# Patient Record
Sex: MALE | Race: WHITE | NOT HISPANIC OR LATINO | Employment: OTHER | URBAN - NONMETROPOLITAN AREA
[De-identification: names, ages, dates, MRNs, and addresses within clinical notes are randomized per-mention and may not be internally consistent; named-entity substitution may affect disease eponyms.]

---

## 2023-11-10 ENCOUNTER — OFFICE VISIT (OUTPATIENT)
Dept: PRIMARY CARE | Facility: CLINIC | Age: 73
End: 2023-11-10
Payer: MEDICARE

## 2023-11-10 VITALS
DIASTOLIC BLOOD PRESSURE: 80 MMHG | HEIGHT: 67 IN | SYSTOLIC BLOOD PRESSURE: 150 MMHG | WEIGHT: 254 LBS | BODY MASS INDEX: 39.87 KG/M2 | HEART RATE: 83 BPM

## 2023-11-10 DIAGNOSIS — Z79.4 TYPE 2 DIABETES MELLITUS WITH HYPERGLYCEMIA, WITH LONG-TERM CURRENT USE OF INSULIN (MULTI): ICD-10-CM

## 2023-11-10 DIAGNOSIS — R35.1 BENIGN PROSTATIC HYPERPLASIA WITH NOCTURIA: ICD-10-CM

## 2023-11-10 DIAGNOSIS — N40.1 BENIGN PROSTATIC HYPERPLASIA WITH NOCTURIA: ICD-10-CM

## 2023-11-10 DIAGNOSIS — E11.65 TYPE 2 DIABETES MELLITUS WITH HYPERGLYCEMIA, WITH LONG-TERM CURRENT USE OF INSULIN (MULTI): ICD-10-CM

## 2023-11-10 DIAGNOSIS — I10 PRIMARY HYPERTENSION: Primary | ICD-10-CM

## 2023-11-10 PROCEDURE — 1160F RVW MEDS BY RX/DR IN RCRD: CPT | Performed by: FAMILY MEDICINE

## 2023-11-10 PROCEDURE — 3079F DIAST BP 80-89 MM HG: CPT | Performed by: FAMILY MEDICINE

## 2023-11-10 PROCEDURE — 1159F MED LIST DOCD IN RCRD: CPT | Performed by: FAMILY MEDICINE

## 2023-11-10 PROCEDURE — 99204 OFFICE O/P NEW MOD 45 MIN: CPT | Performed by: FAMILY MEDICINE

## 2023-11-10 PROCEDURE — 3077F SYST BP >= 140 MM HG: CPT | Performed by: FAMILY MEDICINE

## 2023-11-10 RX ORDER — METFORMIN HYDROCHLORIDE 500 MG/1
1000 TABLET, EXTENDED RELEASE ORAL
COMMUNITY
Start: 2023-08-23 | End: 2023-12-27 | Stop reason: SDUPTHER

## 2023-11-10 RX ORDER — LISINOPRIL AND HYDROCHLOROTHIAZIDE 12.5; 2 MG/1; MG/1
1 TABLET ORAL 2 TIMES DAILY
COMMUNITY
Start: 2023-08-18 | End: 2023-12-27 | Stop reason: SDUPTHER

## 2023-11-10 RX ORDER — GLIPIZIDE 10 MG/1
10 TABLET, FILM COATED, EXTENDED RELEASE ORAL DAILY
COMMUNITY
Start: 2023-08-18 | End: 2023-12-27 | Stop reason: SDUPTHER

## 2023-11-10 RX ORDER — INSULIN GLARGINE 300 U/ML
80 INJECTION, SOLUTION SUBCUTANEOUS EVERY MORNING
COMMUNITY
Start: 2023-10-25 | End: 2023-12-27 | Stop reason: SDUPTHER

## 2023-11-10 RX ORDER — TIRZEPATIDE 7.5 MG/.5ML
7.5 INJECTION, SOLUTION SUBCUTANEOUS
COMMUNITY
Start: 2023-10-23 | End: 2024-01-19 | Stop reason: WASHOUT

## 2023-11-10 RX ORDER — FLASH GLUCOSE SENSOR
KIT MISCELLANEOUS AS NEEDED
COMMUNITY
Start: 2023-09-01

## 2023-11-10 RX ORDER — AMLODIPINE BESYLATE 5 MG/1
5 TABLET ORAL DAILY
Qty: 30 TABLET | Refills: 1 | Status: SHIPPED | OUTPATIENT
Start: 2023-11-10 | End: 2023-12-27 | Stop reason: SINTOL

## 2023-11-10 RX ORDER — LOVASTATIN 20 MG/1
20 TABLET ORAL NIGHTLY
COMMUNITY
Start: 2023-09-22 | End: 2023-12-27 | Stop reason: SDUPTHER

## 2023-11-10 RX ORDER — TAMSULOSIN HYDROCHLORIDE 0.4 MG/1
0.4 CAPSULE ORAL DAILY
COMMUNITY
Start: 2023-09-22 | End: 2023-12-27 | Stop reason: SDUPTHER

## 2023-11-10 ASSESSMENT — PATIENT HEALTH QUESTIONNAIRE - PHQ9
SUM OF ALL RESPONSES TO PHQ9 QUESTIONS 1 AND 2: 0
2. FEELING DOWN, DEPRESSED OR HOPELESS: NOT AT ALL
1. LITTLE INTEREST OR PLEASURE IN DOING THINGS: NOT AT ALL

## 2023-11-10 ASSESSMENT — ENCOUNTER SYMPTOMS
PALPITATIONS: 0
NUMBNESS: 1
COUGH: 0
SHORTNESS OF BREATH: 0

## 2023-11-10 NOTE — PROGRESS NOTES
"Subjective   Patient ID: Yg Vergara is a 73 y.o. male who presents for pt here to est as  new patient .    HPI   Refuses P20 multiple times     DM II June 2022 A1c = 9.1     States labs done 2  months ago states was 7      Was on ozempic and change munjaro now      Latha dupont  diabetic pharmacist  consultant started mounjaro 7.5 mg every 5 day until gone       Check bs freestyle tanesha 2  with 90 day average is 202       No low bs reaction      UTD on eye exam       Has discussed mealtime insulin     HTN no good control   Not checking     High chol on lovastatin not high potency       Bph  nocturia x 4     Colonoscopy never   CG 2 years ago and has done x 2         Review of Systems   Respiratory:  Negative for cough and shortness of breath.    Cardiovascular:  Negative for chest pain and palpitations.   Neurological:  Positive for numbness (feet).       Objective   /80   Pulse 83   Ht 1.689 m (5' 6.5\")   Wt 115 kg (254 lb)   BMI 40.38 kg/m²     Physical Exam  Constitutional:       Appearance: Normal appearance.   HENT:      Head: Normocephalic and atraumatic.   Eyes:      Conjunctiva/sclera: Conjunctivae normal.      Pupils: Pupils are equal, round, and reactive to light.   Cardiovascular:      Rate and Rhythm: Normal rate and regular rhythm.      Heart sounds: Normal heart sounds.   Pulmonary:      Effort: Pulmonary effort is normal.      Breath sounds: Normal breath sounds.   Lymphadenopathy:      Cervical: No cervical adenopathy.   Skin:     Coloration: Skin is not jaundiced.   Neurological:      General: No focal deficit present.      Mental Status: He is alert and oriented to person, place, and time.   Psychiatric:         Mood and Affect: Mood normal.         Behavior: Behavior normal.         Thought Content: Thought content normal.         Judgment: Judgment normal.         Assessment/Plan   Diagnoses and all orders for this visit:  Primary hypertension  -     amLODIPine (Norvasc) 5 mg tablet; " Take 1 tablet (5 mg) by mouth once daily.  Type 2 diabetes mellitus with hyperglycemia, with long-term current use of insulin (CMS/[facility])  -     Lipid Panel; Future  -     Hemoglobin A1C; Future  -     Creatinine, Urine Random; Future  -     Albumin , Urine Random; Future  -     Basic Metabolic Panel; Future  -     CBC; Future  Benign prostatic hyperplasia with nocturia

## 2023-12-04 ENCOUNTER — LAB (OUTPATIENT)
Dept: LAB | Facility: LAB | Age: 73
End: 2023-12-04
Payer: MEDICARE

## 2023-12-04 DIAGNOSIS — E11.65 TYPE 2 DIABETES MELLITUS WITH HYPERGLYCEMIA, WITH LONG-TERM CURRENT USE OF INSULIN (MULTI): ICD-10-CM

## 2023-12-04 DIAGNOSIS — Z79.4 TYPE 2 DIABETES MELLITUS WITH HYPERGLYCEMIA, WITH LONG-TERM CURRENT USE OF INSULIN (MULTI): ICD-10-CM

## 2023-12-04 LAB
ANION GAP SERPL CALC-SCNC: 14 MMOL/L (ref 10–20)
BUN SERPL-MCNC: 24 MG/DL (ref 6–23)
CALCIUM SERPL-MCNC: 8.7 MG/DL (ref 8.6–10.3)
CHLORIDE SERPL-SCNC: 99 MMOL/L (ref 98–107)
CHOLEST SERPL-MCNC: 134 MG/DL (ref 0–199)
CHOLESTEROL/HDL RATIO: 4.1
CO2 SERPL-SCNC: 29 MMOL/L (ref 21–32)
CREAT SERPL-MCNC: 1.27 MG/DL (ref 0.5–1.3)
CREAT UR-MCNC: 126.8 MG/DL (ref 20–370)
ERYTHROCYTE [DISTWIDTH] IN BLOOD BY AUTOMATED COUNT: 14.3 % (ref 11.5–14.5)
EST. AVERAGE GLUCOSE BLD GHB EST-MCNC: 217 MG/DL
GFR SERPL CREATININE-BSD FRML MDRD: 60 ML/MIN/1.73M*2
GLUCOSE SERPL-MCNC: 100 MG/DL (ref 74–99)
HBA1C MFR BLD: 9.2 %
HCT VFR BLD AUTO: 43.9 % (ref 41–52)
HDLC SERPL-MCNC: 33 MG/DL
HGB BLD-MCNC: 13.9 G/DL (ref 13.5–17.5)
LDLC SERPL CALC-MCNC: 68 MG/DL
MCH RBC QN AUTO: 28 PG (ref 26–34)
MCHC RBC AUTO-ENTMCNC: 31.7 G/DL (ref 32–36)
MCV RBC AUTO: 89 FL (ref 80–100)
MICROALBUMIN UR-MCNC: 82 MG/L
MICROALBUMIN/CREAT UR: 64.7 UG/MG CREAT
NON HDL CHOLESTEROL: 101 MG/DL (ref 0–149)
NRBC BLD-RTO: 0 /100 WBCS (ref 0–0)
PLATELET # BLD AUTO: 276 X10*3/UL (ref 150–450)
POTASSIUM SERPL-SCNC: 4.5 MMOL/L (ref 3.5–5.3)
RBC # BLD AUTO: 4.96 X10*6/UL (ref 4.5–5.9)
SODIUM SERPL-SCNC: 137 MMOL/L (ref 136–145)
TRIGL SERPL-MCNC: 164 MG/DL (ref 0–149)
VLDL: 33 MG/DL (ref 0–40)
WBC # BLD AUTO: 9 X10*3/UL (ref 4.4–11.3)

## 2023-12-04 PROCEDURE — 82043 UR ALBUMIN QUANTITATIVE: CPT

## 2023-12-04 PROCEDURE — 85027 COMPLETE CBC AUTOMATED: CPT

## 2023-12-04 PROCEDURE — 36415 COLL VENOUS BLD VENIPUNCTURE: CPT

## 2023-12-04 PROCEDURE — 80061 LIPID PANEL: CPT

## 2023-12-04 PROCEDURE — 80048 BASIC METABOLIC PNL TOTAL CA: CPT

## 2023-12-04 PROCEDURE — 83036 HEMOGLOBIN GLYCOSYLATED A1C: CPT

## 2023-12-04 PROCEDURE — 82570 ASSAY OF URINE CREATININE: CPT

## 2023-12-27 ENCOUNTER — OFFICE VISIT (OUTPATIENT)
Dept: PRIMARY CARE | Facility: CLINIC | Age: 73
End: 2023-12-27
Payer: MEDICARE

## 2023-12-27 ENCOUNTER — HOSPITAL ENCOUNTER (OUTPATIENT)
Dept: VASCULAR MEDICINE | Facility: HOSPITAL | Age: 73
Discharge: HOME | End: 2023-12-27
Payer: MEDICARE

## 2023-12-27 VITALS
TEMPERATURE: 97.9 F | OXYGEN SATURATION: 92 % | HEART RATE: 88 BPM | BODY MASS INDEX: 40.27 KG/M2 | DIASTOLIC BLOOD PRESSURE: 100 MMHG | WEIGHT: 253.3 LBS | SYSTOLIC BLOOD PRESSURE: 160 MMHG

## 2023-12-27 DIAGNOSIS — N40.1 BENIGN PROSTATIC HYPERPLASIA WITH NOCTURIA: ICD-10-CM

## 2023-12-27 DIAGNOSIS — Z79.4 TYPE 2 DIABETES MELLITUS WITH HYPERGLYCEMIA, WITH LONG-TERM CURRENT USE OF INSULIN (MULTI): ICD-10-CM

## 2023-12-27 DIAGNOSIS — R60.0 LOCALIZED EDEMA: Primary | ICD-10-CM

## 2023-12-27 DIAGNOSIS — M79.661 RIGHT CALF PAIN: ICD-10-CM

## 2023-12-27 DIAGNOSIS — R35.1 BENIGN PROSTATIC HYPERPLASIA WITH NOCTURIA: ICD-10-CM

## 2023-12-27 DIAGNOSIS — E11.65 TYPE 2 DIABETES MELLITUS WITH HYPERGLYCEMIA, WITH LONG-TERM CURRENT USE OF INSULIN (MULTI): ICD-10-CM

## 2023-12-27 DIAGNOSIS — I10 PRIMARY HYPERTENSION: ICD-10-CM

## 2023-12-27 DIAGNOSIS — E78.5 HYPERLIPIDEMIA, UNSPECIFIED HYPERLIPIDEMIA TYPE: ICD-10-CM

## 2023-12-27 DIAGNOSIS — R60.0 LOCALIZED EDEMA: ICD-10-CM

## 2023-12-27 PROCEDURE — 93971 EXTREMITY STUDY: CPT

## 2023-12-27 PROCEDURE — 3080F DIAST BP >= 90 MM HG: CPT | Performed by: FAMILY MEDICINE

## 2023-12-27 PROCEDURE — 3048F LDL-C <100 MG/DL: CPT | Performed by: FAMILY MEDICINE

## 2023-12-27 PROCEDURE — 93971 EXTREMITY STUDY: CPT | Performed by: STUDENT IN AN ORGANIZED HEALTH CARE EDUCATION/TRAINING PROGRAM

## 2023-12-27 PROCEDURE — 99214 OFFICE O/P EST MOD 30 MIN: CPT | Performed by: FAMILY MEDICINE

## 2023-12-27 PROCEDURE — 3060F POS MICROALBUMINURIA REV: CPT | Performed by: FAMILY MEDICINE

## 2023-12-27 PROCEDURE — 3046F HEMOGLOBIN A1C LEVEL >9.0%: CPT | Performed by: FAMILY MEDICINE

## 2023-12-27 PROCEDURE — 3077F SYST BP >= 140 MM HG: CPT | Performed by: FAMILY MEDICINE

## 2023-12-27 PROCEDURE — 1160F RVW MEDS BY RX/DR IN RCRD: CPT | Performed by: FAMILY MEDICINE

## 2023-12-27 PROCEDURE — 1159F MED LIST DOCD IN RCRD: CPT | Performed by: FAMILY MEDICINE

## 2023-12-27 RX ORDER — GLIPIZIDE 10 MG/1
10 TABLET, FILM COATED, EXTENDED RELEASE ORAL DAILY
Qty: 90 TABLET | Refills: 3 | Status: SHIPPED | OUTPATIENT
Start: 2023-12-27 | End: 2023-12-27 | Stop reason: ALTCHOICE

## 2023-12-27 RX ORDER — NIFEDIPINE 30 MG/1
30 TABLET, FILM COATED, EXTENDED RELEASE ORAL DAILY
Qty: 30 TABLET | Refills: 0 | Status: SHIPPED | OUTPATIENT
Start: 2023-12-27 | End: 2024-01-19

## 2023-12-27 RX ORDER — FUROSEMIDE 20 MG/1
20 TABLET ORAL DAILY
Qty: 30 TABLET | Refills: 0 | Status: SHIPPED | OUTPATIENT
Start: 2023-12-27 | End: 2024-01-19

## 2023-12-27 RX ORDER — INSULIN LISPRO 100 [IU]/ML
4 INJECTION, SOLUTION INTRAVENOUS; SUBCUTANEOUS
Qty: 15 ML | Refills: 3 | Status: SHIPPED | OUTPATIENT
Start: 2023-12-27 | End: 2024-03-22 | Stop reason: DRUGHIGH

## 2023-12-27 RX ORDER — TIRZEPATIDE 10 MG/.5ML
10 INJECTION, SOLUTION SUBCUTANEOUS
Qty: 0.5 ML | Refills: 4 | Status: SHIPPED | OUTPATIENT
Start: 2023-12-27 | End: 2024-01-18 | Stop reason: SDUPTHER

## 2023-12-27 RX ORDER — INSULIN GLARGINE 300 U/ML
80 INJECTION, SOLUTION SUBCUTANEOUS EVERY MORNING
Qty: 1.5 ML | Refills: 3 | Status: SHIPPED | OUTPATIENT
Start: 2023-12-27 | End: 2024-01-30 | Stop reason: SDUPTHER

## 2023-12-27 RX ORDER — TIRZEPATIDE 7.5 MG/.5ML
7.5 INJECTION, SOLUTION SUBCUTANEOUS
Status: CANCELLED | OUTPATIENT
Start: 2023-12-27

## 2023-12-27 RX ORDER — PEN NEEDLE, DIABETIC 30 GX3/16"
NEEDLE, DISPOSABLE MISCELLANEOUS
Qty: 100 EACH | Refills: 11 | Status: SHIPPED | OUTPATIENT
Start: 2023-12-27 | End: 2024-12-26

## 2023-12-27 RX ORDER — METFORMIN HYDROCHLORIDE 500 MG/1
1000 TABLET, EXTENDED RELEASE ORAL
Qty: 180 TABLET | Refills: 3 | Status: SHIPPED | OUTPATIENT
Start: 2023-12-27

## 2023-12-27 RX ORDER — LOVASTATIN 20 MG/1
20 TABLET ORAL NIGHTLY
Qty: 90 TABLET | Refills: 3 | Status: SHIPPED | OUTPATIENT
Start: 2023-12-27

## 2023-12-27 RX ORDER — LISINOPRIL AND HYDROCHLOROTHIAZIDE 12.5; 2 MG/1; MG/1
1 TABLET ORAL 2 TIMES DAILY
Qty: 90 TABLET | Refills: 3 | Status: SHIPPED | OUTPATIENT
Start: 2023-12-27

## 2023-12-27 RX ORDER — AMLODIPINE BESYLATE 5 MG/1
5 TABLET ORAL DAILY
Qty: 30 TABLET | Refills: 1 | Status: CANCELLED | OUTPATIENT
Start: 2023-12-27 | End: 2024-06-24

## 2023-12-27 RX ORDER — TAMSULOSIN HYDROCHLORIDE 0.4 MG/1
0.4 CAPSULE ORAL DAILY
Qty: 90 CAPSULE | Refills: 3 | Status: SHIPPED | OUTPATIENT
Start: 2023-12-27

## 2023-12-27 ASSESSMENT — ENCOUNTER SYMPTOMS: HYPERTENSION: 1

## 2023-12-27 NOTE — PROGRESS NOTES
Subjective   Patient ID: Yg Vergara is a 73 y.o. male who presents for Hypertension, Diabetes, Hyperlipidemia, Benign Prostatic Hypertrophy, and Lab Review.    Hypertension    Diabetes    Hyperlipidemia    Benign Prostatic Hypertrophy       Refuses P20 multiple times      DM II June 2022 A1c = 9.1      Latha dupont  diabetic pharmacist  consultant started mounjaro      Will Increase to 10 mg then 12. 5 after 1 month       Check bs freestyle tanesha 2  A1c is still very high has good fasting but in office PP is 24o       Will stop glipizide and start mealtime insulin       No low bs reaction      UTD on eye exam         HTN no good control   unable tolerate amlodipine due to edema    Will try nifedipine      High chol on lovastatin not high potency         Bph  nocturia x 4      Colonoscopy never   CG 2 years ago and has done x 2     Legs swollen and right is more tender  No h/o dvt/pe  Better off of norvasc but still swollen     Review of Systems    Objective   BP (!) 160/100 (BP Location: Left arm, Patient Position: Sitting)   Pulse 88   Temp 36.6 °C (97.9 °F)   Wt 115 kg (253 lb 4.8 oz)   SpO2 92%   BMI 40.27 kg/m²     Physical Exam  Vitals reviewed.   Constitutional:       Appearance: Normal appearance.   HENT:      Head: Normocephalic and atraumatic.   Eyes:      Conjunctiva/sclera: Conjunctivae normal.   Cardiovascular:      Rate and Rhythm: Normal rate and regular rhythm.   Pulmonary:      Effort: Pulmonary effort is normal.      Breath sounds: Normal breath sounds.   Musculoskeletal:      Cervical back: Neck supple.      Right lower leg: Edema present.      Left lower leg: Edema present.      Comments: 3+ with venous stasis changes and redness on right > left  Dp 2+and + calf tenderness and + homans    Skin:     General: Skin is warm and dry.   Neurological:      General: No focal deficit present.      Mental Status: He is alert and oriented to person, place, and time.   Psychiatric:         Mood and  Affect: Mood normal.         Behavior: Behavior normal.         Thought Content: Thought content normal.         Judgment: Judgment normal.         Assessment/Plan   Diagnoses and all orders for this visit:  Localized edema  -     furosemide (Lasix) 20 mg tablet; Take 1 tablet (20 mg) by mouth once daily.  Primary hypertension  -     lisinopriL-hydrochlorothiazide 20-12.5 mg tablet; Take 1 tablet by mouth 2 times a day.  -     NIFEdipine ER (Adalat CC) 30 mg 24 hr tablet; Take 1 tablet (30 mg) by mouth once daily. Do not crush, chew, or split.  Type 2 diabetes mellitus with hyperglycemia, with long-term current use of insulin (CMS/[facility])  -     metFORMIN  mg 24 hr tablet; Take 2 tablets (1,000 mg) by mouth 2 times a day with meals.  -     insulin glargine (Toujeo Max U-300 SoloStar) 300 unit/mL (3 mL) injection; Inject 80 Units under the skin once daily in the morning.  -     tirzepatide (Mounjaro) 10 mg/0.5 mL pen injector; Inject 10 mg under the skin 1 (one) time per week.  -     insulin lispro (HumaLOG) 100 unit/mL injection; Inject 0.04 mL (4 Units) under the skin 3 times a day with meals. Take as directed per insulin instructions.  Benign prostatic hyperplasia with nocturia  -     tamsulosin (Flomax) 0.4 mg 24 hr capsule; Take 1 capsule (0.4 mg) by mouth once daily.  Hyperlipidemia, unspecified hyperlipidemia type  -     lovastatin (Mevacor) 20 mg tablet; Take 1 tablet (20 mg) by mouth once daily at bedtime.  Right calf pain  -     Vascular US Lower Extremity Venous Duplex Right; Future

## 2023-12-27 NOTE — PROGRESS NOTES
HTN:  Patient here for follow-up of elevated blood pressure.  He is exercising and is adherent to a low-salt diet. Blood pressure is well controlled at home. Cardiac symptoms: fatigue and lower extremity edema. Patient denies: chest pain, chest pressure/discomfort, dyspnea, irregular heart beat, and palpitations.       DIABETES: Current symptoms include: paresthesia of the feet. Patient denies foot ulcerations, increased appetite, nausea, visual disturbances, and vomiting. Evaluation to date has been: hemoglobin A1C. Home sugars:  Villa 2 . Last dilated eye exam April 2023.     Laboratory:  Lab Results   Component Value Date    HGBA1C 9.2 (H) 12/04/2023     HYPERLIPIDEMIA:  LDL cholesterol is 68     BPH:  Symptoms include nocturia two times a night. He denies frequency, urgency, and weak stream. He reports a history of no complicating symptoms. He denies no complicating symptoms.

## 2024-01-17 DIAGNOSIS — E11.65 TYPE 2 DIABETES MELLITUS WITH HYPERGLYCEMIA, WITH LONG-TERM CURRENT USE OF INSULIN (MULTI): ICD-10-CM

## 2024-01-17 DIAGNOSIS — R60.0 LOCALIZED EDEMA: ICD-10-CM

## 2024-01-17 DIAGNOSIS — Z79.4 TYPE 2 DIABETES MELLITUS WITH HYPERGLYCEMIA, WITH LONG-TERM CURRENT USE OF INSULIN (MULTI): ICD-10-CM

## 2024-01-17 DIAGNOSIS — I10 PRIMARY HYPERTENSION: ICD-10-CM

## 2024-01-19 RX ORDER — TIRZEPATIDE 10 MG/.5ML
10 INJECTION, SOLUTION SUBCUTANEOUS
Qty: 0.5 ML | Refills: 4 | Status: SHIPPED | OUTPATIENT
Start: 2024-01-19 | End: 2024-01-30 | Stop reason: ALTCHOICE

## 2024-01-19 RX ORDER — NIFEDIPINE 30 MG/1
30 TABLET, FILM COATED, EXTENDED RELEASE ORAL DAILY
Qty: 30 TABLET | Refills: 0 | Status: SHIPPED | OUTPATIENT
Start: 2024-01-19 | End: 2024-01-30 | Stop reason: SINTOL

## 2024-01-19 RX ORDER — FUROSEMIDE 20 MG/1
20 TABLET ORAL DAILY
Qty: 30 TABLET | Refills: 0 | Status: SHIPPED | OUTPATIENT
Start: 2024-01-19 | End: 2024-01-30 | Stop reason: SDUPTHER

## 2024-01-30 ENCOUNTER — OFFICE VISIT (OUTPATIENT)
Dept: PRIMARY CARE | Facility: CLINIC | Age: 74
End: 2024-01-30
Payer: MEDICARE

## 2024-01-30 VITALS
OXYGEN SATURATION: 98 % | WEIGHT: 254.2 LBS | BODY MASS INDEX: 40.41 KG/M2 | SYSTOLIC BLOOD PRESSURE: 140 MMHG | DIASTOLIC BLOOD PRESSURE: 80 MMHG | HEART RATE: 98 BPM | TEMPERATURE: 97 F

## 2024-01-30 DIAGNOSIS — I10 PRIMARY HYPERTENSION: ICD-10-CM

## 2024-01-30 DIAGNOSIS — Z12.5 ENCOUNTER FOR SCREENING FOR MALIGNANT NEOPLASM OF PROSTATE: Primary | ICD-10-CM

## 2024-01-30 DIAGNOSIS — Z00.00 HEALTHCARE MAINTENANCE: ICD-10-CM

## 2024-01-30 DIAGNOSIS — R60.0 LOCALIZED EDEMA: ICD-10-CM

## 2024-01-30 DIAGNOSIS — Z79.4 TYPE 2 DIABETES MELLITUS WITH HYPERGLYCEMIA, WITH LONG-TERM CURRENT USE OF INSULIN (MULTI): ICD-10-CM

## 2024-01-30 DIAGNOSIS — E11.65 TYPE 2 DIABETES MELLITUS WITH HYPERGLYCEMIA, WITH LONG-TERM CURRENT USE OF INSULIN (MULTI): ICD-10-CM

## 2024-01-30 PROCEDURE — 1159F MED LIST DOCD IN RCRD: CPT | Performed by: FAMILY MEDICINE

## 2024-01-30 PROCEDURE — 3077F SYST BP >= 140 MM HG: CPT | Performed by: FAMILY MEDICINE

## 2024-01-30 PROCEDURE — 99214 OFFICE O/P EST MOD 30 MIN: CPT | Performed by: FAMILY MEDICINE

## 2024-01-30 PROCEDURE — 3079F DIAST BP 80-89 MM HG: CPT | Performed by: FAMILY MEDICINE

## 2024-01-30 PROCEDURE — 1160F RVW MEDS BY RX/DR IN RCRD: CPT | Performed by: FAMILY MEDICINE

## 2024-01-30 RX ORDER — FUROSEMIDE 20 MG/1
20 TABLET ORAL DAILY
Qty: 30 TABLET | Refills: 11 | Status: SHIPPED | OUTPATIENT
Start: 2024-01-30 | End: 2024-02-29 | Stop reason: SDUPTHER

## 2024-01-30 RX ORDER — INSULIN GLARGINE 300 [IU]/ML
80 INJECTION, SOLUTION SUBCUTANEOUS EVERY MORNING
Qty: 15 ML | Refills: 11 | Status: SHIPPED | OUTPATIENT
Start: 2024-01-30 | End: 2024-03-22 | Stop reason: DRUGHIGH

## 2024-01-30 RX ORDER — SPIRONOLACTONE 25 MG/1
25 TABLET ORAL 2 TIMES DAILY
Qty: 60 TABLET | Refills: 11 | Status: SHIPPED | OUTPATIENT
Start: 2024-01-30 | End: 2024-04-30 | Stop reason: WASHOUT

## 2024-01-30 RX ORDER — NIFEDIPINE 30 MG/1
30 TABLET, FILM COATED, EXTENDED RELEASE ORAL DAILY
Qty: 30 TABLET | Status: CANCELLED | OUTPATIENT
Start: 2024-01-30

## 2024-01-30 RX ORDER — TIRZEPATIDE 12.5 MG/.5ML
12.5 INJECTION, SOLUTION SUBCUTANEOUS
Qty: 2 ML | Refills: 1 | Status: SHIPPED | OUTPATIENT
Start: 2024-01-30 | End: 2024-03-22 | Stop reason: WASHOUT

## 2024-01-30 ASSESSMENT — ENCOUNTER SYMPTOMS: HYPERTENSION: 1

## 2024-01-30 NOTE — PROGRESS NOTES
Hypertension:  He is exercising and is eating a low salt diet.  Blood pressure is being monitored at home. Cardiac symptoms lower extremity edema. Patient denies chest pain, chest pressure/discomfort, dyspnea, fatigue, and palpitations.  Started nifedipine and his readings are still not consistent but has went down     Edema: he is still having swelling in his lower extremities   12/27/23 Vascular US    Diabetes:  He has not noticed any change in his blood sugar reading with adding the Humalog before meals.

## 2024-01-30 NOTE — PROGRESS NOTES
Subjective   Patient ID: Yg Vergara is a 73 y.o. male who presents for Hypertension, Edema, and Diabetes.    Hypertension    Edema  Diabetes       Refuses P20 multiple times      DM II June 2022 A1c = 9.1      Latha dupont  diabetic pharmacist  consultant started mounjaro now up to 12.5       No low bs reaction      UTD on eye exam    Bs log reviewed am are good but will increase correction to 2 per 50 mg over 150        HTN no good control   unable tolerate amlodipine due to edema    Will try nifedipine still swelling will dc and add aldacton      High chol on lovastatin not high potency         Bph  nocturia x 4      Colonoscopy never   CG 2 years ago and has done x 2      Legs swollen and right is more tender  No h/o dvt/pe  Duplex in 12/23 was neg   Review of Systems    Objective   /80 (BP Location: Right arm, Patient Position: Sitting)   Pulse 98   Temp 36.1 °C (97 °F)   Wt 115 kg (254 lb 3.2 oz)   SpO2 98%   BMI 40.41 kg/m²     Physical Exam  Vitals reviewed.   Constitutional:       Appearance: Normal appearance.   HENT:      Head: Normocephalic and atraumatic.   Musculoskeletal:      Right lower leg: Edema present.      Left lower leg: Edema present.   Skin:     General: Skin is warm and dry.      Findings: Erythema present.   Neurological:      General: No focal deficit present.      Mental Status: He is alert and oriented to person, place, and time.   Psychiatric:         Mood and Affect: Mood normal.         Behavior: Behavior normal.         Thought Content: Thought content normal.         Judgment: Judgment normal.         Assessment/Plan   Diagnoses and all orders for this visit:  Encounter for screening for malignant neoplasm of prostate  -     Prostate Specific Antigen, Screen; Future  Localized edema  -     furosemide (Lasix) 20 mg tablet; Take 1 tablet (20 mg) by mouth once daily.  Primary hypertension  -     spironolactone (Aldactone) 25 mg tablet; Take 1 tablet (25 mg) by mouth 2  times a day.  Healthcare maintenance  -     Prostate Specific Antigen, Screen; Future  Type 2 diabetes mellitus with hyperglycemia, with long-term current use of insulin (CMS/[facility])  -     Hemoglobin A1C; Future  -     Basic Metabolic Panel; Future  -     tirzepatide (Mounjaro) 12.5 mg/0.5 mL pen injector; Inject 12.5 mg under the skin 1 (one) time per week.  -     insulin glargine (Toujeo Max U-300 SoloStar) 300 unit/mL (3 mL) injection; Inject 80 Units under the skin once daily in the morning.

## 2024-02-29 ENCOUNTER — TELEPHONE (OUTPATIENT)
Dept: PRIMARY CARE | Facility: CLINIC | Age: 74
End: 2024-02-29
Payer: MEDICARE

## 2024-02-29 DIAGNOSIS — R60.0 LOCALIZED EDEMA: ICD-10-CM

## 2024-02-29 RX ORDER — FUROSEMIDE 40 MG/1
40 TABLET ORAL DAILY
Qty: 30 TABLET | Refills: 0 | Status: SHIPPED | OUTPATIENT
Start: 2024-02-29 | End: 2024-04-05 | Stop reason: SDUPTHER

## 2024-02-29 NOTE — TELEPHONE ENCOUNTER
Right leg is still swollen and red from taking the other BP medication. He is unable to get mounjaro   Please advise.

## 2024-02-29 NOTE — TELEPHONE ENCOUNTER
Right leg is still swollen and red from taking the other BP medication. And unable to get mounjaro. Asking for a call back.

## 2024-02-29 NOTE — TELEPHONE ENCOUNTER
Spoke with patient and he said the pharmacist feels he has an infection in the leg and needs an ATB.  Please advise.

## 2024-03-01 ENCOUNTER — TELEPHONE (OUTPATIENT)
Dept: PRIMARY CARE | Facility: CLINIC | Age: 74
End: 2024-03-01
Payer: MEDICARE

## 2024-03-01 DIAGNOSIS — L03.90 CELLULITIS, UNSPECIFIED CELLULITIS SITE: Primary | ICD-10-CM

## 2024-03-01 RX ORDER — CEPHALEXIN 500 MG/1
500 CAPSULE ORAL 4 TIMES DAILY
Qty: 28 CAPSULE | Refills: 0 | Status: SHIPPED | OUTPATIENT
Start: 2024-03-01 | End: 2024-03-05 | Stop reason: SDUPTHER

## 2024-03-01 NOTE — TELEPHONE ENCOUNTER
Patient was told to make an appointment with Dr. Downing for next week, Dr Downing has no openings next week. I put patient in with Dr. Hurtado 3/5/24 @ 830am  Please let me know if this is alright or if you would like to double book him

## 2024-03-01 NOTE — TELEPHONE ENCOUNTER
Spoke with patient's wife and she will call and schedule an appointment for next week and pick the up script today.

## 2024-03-05 ENCOUNTER — OFFICE VISIT (OUTPATIENT)
Dept: PRIMARY CARE | Facility: CLINIC | Age: 74
End: 2024-03-05
Payer: MEDICARE

## 2024-03-05 VITALS
BODY MASS INDEX: 39.55 KG/M2 | HEART RATE: 116 BPM | SYSTOLIC BLOOD PRESSURE: 118 MMHG | HEIGHT: 67 IN | WEIGHT: 252 LBS | DIASTOLIC BLOOD PRESSURE: 76 MMHG | OXYGEN SATURATION: 93 %

## 2024-03-05 DIAGNOSIS — L03.115 CELLULITIS OF RIGHT LOWER LEG: Primary | ICD-10-CM

## 2024-03-05 DIAGNOSIS — L03.90 CELLULITIS, UNSPECIFIED CELLULITIS SITE: ICD-10-CM

## 2024-03-05 PROCEDURE — 1160F RVW MEDS BY RX/DR IN RCRD: CPT | Performed by: NURSE PRACTITIONER

## 2024-03-05 PROCEDURE — 1159F MED LIST DOCD IN RCRD: CPT | Performed by: NURSE PRACTITIONER

## 2024-03-05 PROCEDURE — 99213 OFFICE O/P EST LOW 20 MIN: CPT | Performed by: NURSE PRACTITIONER

## 2024-03-05 RX ORDER — CEPHALEXIN 500 MG/1
500 CAPSULE ORAL 4 TIMES DAILY
Qty: 56 CAPSULE | Refills: 0 | Status: SHIPPED | OUTPATIENT
Start: 2024-03-05 | End: 2024-03-22 | Stop reason: SDUPTHER

## 2024-03-05 ASSESSMENT — ENCOUNTER SYMPTOMS
FEVER: 0
SHORTNESS OF BREATH: 0
COUGH: 0

## 2024-03-05 NOTE — PROGRESS NOTES
"Subjective   Patient ID: gY Vergara is a 73 y.o. male who presents for Leg Swelling (Right leg - since starting the amlodipine).  Here with both legs swelling since starting the amlodipine  Open sore since this started also. Made a phone call last week and started antibiotic for the sore area.   Has improved a little, but still swelled, red, and hard.  Stopped the amlodipine about 3 weeks ago and will be starting the increased lasix tomorrow.   Hasn't been able to get his Mounjaro, but started a OTC GluNozym, which seems to have help his BS and weight  Current . \"Had been up in the 300's\".        Review of Systems   Constitutional:  Negative for fever.   Respiratory:  Negative for cough and shortness of breath.    Cardiovascular:  Positive for leg swelling. Negative for chest pain.       Objective   Physical Exam  Constitutional:       Appearance: Normal appearance.   HENT:      Head: Normocephalic.   Eyes:      Conjunctiva/sclera: Conjunctivae normal.   Cardiovascular:      Rate and Rhythm: Normal rate and regular rhythm.      Pulses: Normal pulses.      Heart sounds: Normal heart sounds.   Pulmonary:      Effort: Pulmonary effort is normal.      Breath sounds: Normal breath sounds.   Musculoskeletal:      Cervical back: Neck supple.      Right lower leg: 3+ Pitting Edema present.      Left lower leg: No edema.   Skin:     General: Skin is warm and dry.      Findings: Wound (Right leg abraded type wound approximately 1 cm upper slightly lateral lower leg. Scant bloody drainage.) present.   Neurological:      Mental Status: He is alert.   Psychiatric:         Mood and Affect: Mood normal.         Assessment/Plan   Yg was seen today for leg swelling.  Diagnoses and all orders for this visit:  Cellulitis of right lower leg (Primary)  -     cephalexin (Keflex) 500 mg capsule; Take 1 capsule (500 mg) by mouth 4 times a day for 14 days.  -     Follow Up In Primary Care - Established; Future  Cellulitis, " unspecified cellulitis site        Has had swelling lower legs since starting amlodipine about 2-3 months ago. Only took this a short time before stopping it.  The left leg swelling has cleared, but the right leg still swelled with cellulitis. Dr. Downing prescribed Cephalexin and increased his Lasix to 40mg last week. Dr. Downing examined the leg while he was here today. Pt. Feels the wound is a little better.  Pt. States hasn't started the increase lasix yet. Planning to start it tomorrow. Advised ok to take extra 20mg today.  Continue the Cephalexin and follow up 2 weeks.  Slightly tachycardic/asymptomatic. Will monitor at follow up appointment    PATITO Evangelista-CNP 03/05/24 1:50 PM

## 2024-03-22 ENCOUNTER — OFFICE VISIT (OUTPATIENT)
Dept: PRIMARY CARE | Facility: CLINIC | Age: 74
End: 2024-03-22
Payer: MEDICARE

## 2024-03-22 VITALS
HEART RATE: 88 BPM | BODY MASS INDEX: 38.45 KG/M2 | HEIGHT: 67 IN | SYSTOLIC BLOOD PRESSURE: 112 MMHG | WEIGHT: 245 LBS | DIASTOLIC BLOOD PRESSURE: 74 MMHG

## 2024-03-22 DIAGNOSIS — L03.115 CELLULITIS OF RIGHT LOWER LEG: ICD-10-CM

## 2024-03-22 DIAGNOSIS — E11.65 TYPE 2 DIABETES MELLITUS WITH HYPERGLYCEMIA, WITH LONG-TERM CURRENT USE OF INSULIN (MULTI): Primary | ICD-10-CM

## 2024-03-22 DIAGNOSIS — Z79.4 TYPE 2 DIABETES MELLITUS WITH HYPERGLYCEMIA, WITH LONG-TERM CURRENT USE OF INSULIN (MULTI): Primary | ICD-10-CM

## 2024-03-22 PROCEDURE — 99213 OFFICE O/P EST LOW 20 MIN: CPT | Performed by: FAMILY MEDICINE

## 2024-03-22 PROCEDURE — 3078F DIAST BP <80 MM HG: CPT | Performed by: FAMILY MEDICINE

## 2024-03-22 PROCEDURE — 1160F RVW MEDS BY RX/DR IN RCRD: CPT | Performed by: FAMILY MEDICINE

## 2024-03-22 PROCEDURE — 1159F MED LIST DOCD IN RCRD: CPT | Performed by: FAMILY MEDICINE

## 2024-03-22 PROCEDURE — 3074F SYST BP LT 130 MM HG: CPT | Performed by: FAMILY MEDICINE

## 2024-03-22 RX ORDER — CEPHALEXIN 500 MG/1
500 CAPSULE ORAL 4 TIMES DAILY
Qty: 28 CAPSULE | Refills: 0 | Status: SHIPPED | OUTPATIENT
Start: 2024-03-22 | End: 2024-03-29

## 2024-03-22 RX ORDER — INSULIN LISPRO 100 [IU]/ML
18 INJECTION, SOLUTION INTRAVENOUS; SUBCUTANEOUS
Qty: 15 ML | Refills: 3 | Status: SHIPPED | OUTPATIENT
Start: 2024-03-22 | End: 2024-04-22 | Stop reason: SDUPTHER

## 2024-03-22 RX ORDER — INSULIN GLARGINE 300 [IU]/ML
50 INJECTION, SOLUTION SUBCUTANEOUS EVERY MORNING
Qty: 15 ML | Refills: 11 | Status: SHIPPED | OUTPATIENT
Start: 2024-03-22

## 2024-03-22 ASSESSMENT — ENCOUNTER SYMPTOMS: FEVER: 0

## 2024-03-22 ASSESSMENT — PATIENT HEALTH QUESTIONNAIRE - PHQ9
2. FEELING DOWN, DEPRESSED OR HOPELESS: NOT AT ALL
SUM OF ALL RESPONSES TO PHQ9 QUESTIONS 1 AND 2: 0
1. LITTLE INTEREST OR PLEASURE IN DOING THINGS: NOT AT ALL

## 2024-03-22 NOTE — PROGRESS NOTES
"Subjective   Patient ID: Yg Vergara is a 73 y.o. male who presents for pt here for follow up right leg cellulitis (Pt c/o feeling tired, no energy ).    HPI   Right leg is better but still red  Swelling is better   Now no ulcer now has healed   Still has a couple of days of keflex     DM II     400 last night      High 200s    Will decrease toujeo to 50 form 80 and increase mealtime to 18 + 1 per 50 over 150  Lost 16 pounds  Tired all the time    No cp no sob  Dizziness will hold aldactone for 1 momth       Review of Systems   Constitutional:  Negative for fever.       Objective   /74   Pulse 88   Ht 1.689 m (5' 6.5\")   Wt 111 kg (245 lb)   BMI 38.95 kg/m²     Physical Exam  Constitutional:       Appearance: Normal appearance.   HENT:      Head: Normocephalic and atraumatic.   Eyes:      Conjunctiva/sclera: Conjunctivae normal.   Cardiovascular:      Heart sounds: Normal heart sounds.   Lymphadenopathy:      Cervical: No cervical adenopathy.   Skin:     Coloration: Skin is not jaundiced.      Comments: Right leg 1+ pitting edema with no pain to palpationan sl redness but more hyperpigmentation   Neurological:      General: No focal deficit present.      Mental Status: He is alert and oriented to person, place, and time.   Psychiatric:         Mood and Affect: Mood normal.         Behavior: Behavior normal.         Thought Content: Thought content normal.         Judgment: Judgment normal.         Assessment/Plan   Diagnoses and all orders for this visit:  Type 2 diabetes mellitus with hyperglycemia, with long-term current use of insulin (CMS/[facility])  -     semaglutide (Rybelsus) 3 mg tablet; Take 1 tablet (3 mg) by mouth once daily.  -     insulin glargine (Toujeo Max U-300 SoloStar) 300 unit/mL (3 mL) injection; Inject 50 Units under the skin once daily in the morning.  -     insulin lispro (HumaLOG) 100 unit/mL injection; Inject 0.18 mL (18 Units) under the skin 3 times a day with meals. Take as directed " per insulin instructions.  Cellulitis of right lower leg  -     Follow Up In Primary Care - Established  -     cephalexin (Keflex) 500 mg capsule; Take 1 capsule (500 mg) by mouth 4 times a day for 7 days.

## 2024-04-01 ENCOUNTER — LAB (OUTPATIENT)
Dept: LAB | Facility: LAB | Age: 74
End: 2024-04-01
Payer: MEDICARE

## 2024-04-01 DIAGNOSIS — Z12.5 ENCOUNTER FOR SCREENING FOR MALIGNANT NEOPLASM OF PROSTATE: ICD-10-CM

## 2024-04-01 DIAGNOSIS — E11.65 TYPE 2 DIABETES MELLITUS WITH HYPERGLYCEMIA, WITH LONG-TERM CURRENT USE OF INSULIN (MULTI): ICD-10-CM

## 2024-04-01 DIAGNOSIS — Z00.00 HEALTHCARE MAINTENANCE: ICD-10-CM

## 2024-04-01 DIAGNOSIS — Z79.4 TYPE 2 DIABETES MELLITUS WITH HYPERGLYCEMIA, WITH LONG-TERM CURRENT USE OF INSULIN (MULTI): ICD-10-CM

## 2024-04-01 LAB
ANION GAP SERPL CALC-SCNC: 14 MMOL/L (ref 10–20)
BUN SERPL-MCNC: 43 MG/DL (ref 6–23)
CALCIUM SERPL-MCNC: 8.8 MG/DL (ref 8.6–10.3)
CHLORIDE SERPL-SCNC: 100 MMOL/L (ref 98–107)
CO2 SERPL-SCNC: 29 MMOL/L (ref 21–32)
CREAT SERPL-MCNC: 1.64 MG/DL (ref 0.5–1.3)
EGFRCR SERPLBLD CKD-EPI 2021: 44 ML/MIN/1.73M*2
EST. AVERAGE GLUCOSE BLD GHB EST-MCNC: 235 MG/DL
GLUCOSE SERPL-MCNC: 205 MG/DL (ref 74–99)
HBA1C MFR BLD: 9.8 %
POTASSIUM SERPL-SCNC: 4.8 MMOL/L (ref 3.5–5.3)
PSA SERPL-MCNC: 1.66 NG/ML
SODIUM SERPL-SCNC: 138 MMOL/L (ref 136–145)

## 2024-04-01 PROCEDURE — 80048 BASIC METABOLIC PNL TOTAL CA: CPT

## 2024-04-01 PROCEDURE — 36415 COLL VENOUS BLD VENIPUNCTURE: CPT

## 2024-04-01 PROCEDURE — G0103 PSA SCREENING: HCPCS

## 2024-04-01 PROCEDURE — 83036 HEMOGLOBIN GLYCOSYLATED A1C: CPT

## 2024-04-05 DIAGNOSIS — R60.0 LOCALIZED EDEMA: ICD-10-CM

## 2024-04-05 RX ORDER — FUROSEMIDE 40 MG/1
40 TABLET ORAL DAILY
Qty: 30 TABLET | Refills: 1 | Status: SHIPPED | OUTPATIENT
Start: 2024-04-05 | End: 2024-04-30 | Stop reason: SDUPTHER

## 2024-04-05 NOTE — TELEPHONE ENCOUNTER
Medication Refill Request    Medication:  furosemide    Pharmacy:  [facility]    Last OV:  3/22/24  Upcoming appointment:  4/30/24    ORDER PENDED

## 2024-04-08 DIAGNOSIS — E11.65 TYPE 2 DIABETES MELLITUS WITH HYPERGLYCEMIA, WITH LONG-TERM CURRENT USE OF INSULIN (MULTI): ICD-10-CM

## 2024-04-08 DIAGNOSIS — Z79.4 TYPE 2 DIABETES MELLITUS WITH HYPERGLYCEMIA, WITH LONG-TERM CURRENT USE OF INSULIN (MULTI): ICD-10-CM

## 2024-04-08 RX ORDER — INSULIN LISPRO 100 [IU]/ML
INJECTION, SOLUTION INTRAVENOUS; SUBCUTANEOUS
Qty: 15 ML | Refills: 3 | OUTPATIENT
Start: 2024-04-08

## 2024-04-15 ENCOUNTER — TELEPHONE (OUTPATIENT)
Dept: PRIMARY CARE | Facility: CLINIC | Age: 74
End: 2024-04-15
Payer: MEDICARE

## 2024-04-15 DIAGNOSIS — Z79.4 TYPE 2 DIABETES MELLITUS WITH HYPERGLYCEMIA, WITH LONG-TERM CURRENT USE OF INSULIN (MULTI): ICD-10-CM

## 2024-04-15 DIAGNOSIS — E11.65 TYPE 2 DIABETES MELLITUS WITH HYPERGLYCEMIA, WITH LONG-TERM CURRENT USE OF INSULIN (MULTI): ICD-10-CM

## 2024-04-15 RX ORDER — INSULIN LISPRO 100 [IU]/ML
18 INJECTION, SOLUTION INTRAVENOUS; SUBCUTANEOUS
Qty: 15 ML | Refills: 3 | Status: CANCELLED | OUTPATIENT
Start: 2024-04-15 | End: 2025-04-15

## 2024-04-22 DIAGNOSIS — E11.65 TYPE 2 DIABETES MELLITUS WITH HYPERGLYCEMIA, WITH LONG-TERM CURRENT USE OF INSULIN (MULTI): ICD-10-CM

## 2024-04-22 DIAGNOSIS — Z79.4 TYPE 2 DIABETES MELLITUS WITH HYPERGLYCEMIA, WITH LONG-TERM CURRENT USE OF INSULIN (MULTI): ICD-10-CM

## 2024-04-22 RX ORDER — INSULIN LISPRO 100 [IU]/ML
18 INJECTION, SOLUTION INTRAVENOUS; SUBCUTANEOUS
Qty: 15 ML | Refills: 3 | Status: SHIPPED | OUTPATIENT
Start: 2024-04-22 | End: 2024-04-30 | Stop reason: SDUPTHER

## 2024-04-22 NOTE — TELEPHONE ENCOUNTER
[facility] states they require a new script. Patient has been out for 1 week, and is unable to refill until May with pharmacy even though chart states there are refills. Please call patient if you are unable to do this, since he has been out for so long.  Patient uses 18 units UNLESS blood sugar is high, at that point he adds 4 units. This depleted his supply earlier than anticipated.   insulin lispro (HumaLOG) 100 unit/mL injection

## 2024-04-30 ENCOUNTER — OFFICE VISIT (OUTPATIENT)
Dept: PRIMARY CARE | Facility: CLINIC | Age: 74
End: 2024-04-30
Payer: MEDICARE

## 2024-04-30 VITALS
DIASTOLIC BLOOD PRESSURE: 76 MMHG | SYSTOLIC BLOOD PRESSURE: 120 MMHG | TEMPERATURE: 97.5 F | OXYGEN SATURATION: 93 % | WEIGHT: 251.4 LBS | HEART RATE: 91 BPM | BODY MASS INDEX: 39.97 KG/M2

## 2024-04-30 DIAGNOSIS — E11.42 DIABETIC POLYNEUROPATHY ASSOCIATED WITH TYPE 2 DIABETES MELLITUS (MULTI): ICD-10-CM

## 2024-04-30 DIAGNOSIS — R60.0 LOCALIZED EDEMA: ICD-10-CM

## 2024-04-30 DIAGNOSIS — E11.65 TYPE 2 DIABETES MELLITUS WITH HYPERGLYCEMIA, WITH LONG-TERM CURRENT USE OF INSULIN (MULTI): ICD-10-CM

## 2024-04-30 DIAGNOSIS — Z00.00 ROUTINE GENERAL MEDICAL EXAMINATION AT HEALTH CARE FACILITY: Primary | ICD-10-CM

## 2024-04-30 DIAGNOSIS — N18.32 STAGE 3B CHRONIC KIDNEY DISEASE (MULTI): ICD-10-CM

## 2024-04-30 DIAGNOSIS — Z79.4 TYPE 2 DIABETES MELLITUS WITH HYPERGLYCEMIA, WITH LONG-TERM CURRENT USE OF INSULIN (MULTI): ICD-10-CM

## 2024-04-30 PROCEDURE — 1160F RVW MEDS BY RX/DR IN RCRD: CPT | Performed by: FAMILY MEDICINE

## 2024-04-30 PROCEDURE — 99214 OFFICE O/P EST MOD 30 MIN: CPT | Performed by: FAMILY MEDICINE

## 2024-04-30 PROCEDURE — 1170F FXNL STATUS ASSESSED: CPT | Performed by: FAMILY MEDICINE

## 2024-04-30 PROCEDURE — 1123F ACP DISCUSS/DSCN MKR DOCD: CPT | Performed by: FAMILY MEDICINE

## 2024-04-30 PROCEDURE — 1158F ADVNC CARE PLAN TLK DOCD: CPT | Performed by: FAMILY MEDICINE

## 2024-04-30 PROCEDURE — 3078F DIAST BP <80 MM HG: CPT | Performed by: FAMILY MEDICINE

## 2024-04-30 PROCEDURE — 3074F SYST BP LT 130 MM HG: CPT | Performed by: FAMILY MEDICINE

## 2024-04-30 PROCEDURE — 1159F MED LIST DOCD IN RCRD: CPT | Performed by: FAMILY MEDICINE

## 2024-04-30 PROCEDURE — G0439 PPPS, SUBSEQ VISIT: HCPCS | Performed by: FAMILY MEDICINE

## 2024-04-30 PROCEDURE — 3046F HEMOGLOBIN A1C LEVEL >9.0%: CPT | Performed by: FAMILY MEDICINE

## 2024-04-30 RX ORDER — GABAPENTIN 300 MG/1
300 CAPSULE ORAL NIGHTLY
Qty: 30 CAPSULE | Refills: 2 | Status: SHIPPED | OUTPATIENT
Start: 2024-04-30 | End: 2025-04-30

## 2024-04-30 RX ORDER — FUROSEMIDE 20 MG/1
20 TABLET ORAL DAILY
Qty: 30 TABLET | Refills: 2 | Status: SHIPPED | OUTPATIENT
Start: 2024-04-30 | End: 2025-04-30

## 2024-04-30 RX ORDER — INSULIN LISPRO 100 [IU]/ML
20 INJECTION, SOLUTION INTRAVENOUS; SUBCUTANEOUS
Qty: 30 ML | Refills: 11 | Status: SHIPPED | OUTPATIENT
Start: 2024-04-30 | End: 2025-04-30

## 2024-04-30 ASSESSMENT — PATIENT HEALTH QUESTIONNAIRE - PHQ9
1. LITTLE INTEREST OR PLEASURE IN DOING THINGS: NOT AT ALL
SUM OF ALL RESPONSES TO PHQ9 QUESTIONS 1 AND 2: 0
2. FEELING DOWN, DEPRESSED OR HOPELESS: NOT AT ALL

## 2024-04-30 ASSESSMENT — ACTIVITIES OF DAILY LIVING (ADL)
DRESSING: INDEPENDENT
TAKING_MEDICATION: INDEPENDENT
MANAGING_FINANCES: INDEPENDENT
DOING_HOUSEWORK: INDEPENDENT
GROCERY_SHOPPING: INDEPENDENT
BATHING: INDEPENDENT

## 2024-04-30 ASSESSMENT — ENCOUNTER SYMPTOMS
OCCASIONAL FEELINGS OF UNSTEADINESS: 1
LOSS OF SENSATION IN FEET: 0
DEPRESSION: 0

## 2024-04-30 NOTE — PROGRESS NOTES
Subjective   Reason for Visit: Yg Vergara is an 73 y.o. male here for a Medicare Wellness visit.     Past Medical, Surgical, and Family History reviewed and updated in chart.    Reviewed all medications by prescribing practitioner or clinical pharmacist (such as prescriptions, OTCs, herbal therapies and supplements) and documented in the medical record.    HPI    Hpoa wife   Living will yes   Code dnr    Htn well controlled on medications    Ckd 3b will dec lasix   Potasssium normal   Gfr has dropped and edema is improved   Will decr lasix to 20 mg       DM II   Poorly controlled   This am 201 took 20 units  and now 312 2 hours ago      Ate sausage gravy and potatoes and tea   Will increase toujeo to 60 and increase mealtime to 20 + 2 per 50 over 150    Neruopathy both feet   Has seen podiatrist and eye doctor this month.       Has gained 6 pounds  Tired all the time     No cp no sob  Dizziness  is less than in the past       P20 refuses   AAA screen states normal former smoker 2022 with Dr Leone    Patient Care Team:  Kirk Downing MD as PCP - General (Family Medicine)     Review of Systems    Objective   Vitals:  /76 (BP Location: Right arm, Patient Position: Sitting)   Pulse 91   Temp 36.4 °C (97.5 °F)   Wt 114 kg (251 lb 6.4 oz)   SpO2 93%   BMI 39.97 kg/m²       Physical Exam  Constitutional:       Appearance: Normal appearance.   HENT:      Head: Normocephalic and atraumatic.   Eyes:      Conjunctiva/sclera: Conjunctivae normal.      Pupils: Pupils are equal, round, and reactive to light.   Cardiovascular:      Rate and Rhythm: Normal rate and regular rhythm.      Heart sounds: Normal heart sounds.   Pulmonary:      Effort: Pulmonary effort is normal.      Breath sounds: Normal breath sounds.   Musculoskeletal:      Right lower leg: Edema present.      Left lower leg: Edema present.   Lymphadenopathy:      Cervical: No cervical adenopathy.   Skin:     Coloration: Skin is not jaundiced.    Neurological:      General: No focal deficit present.      Mental Status: He is alert and oriented to person, place, and time.      Sensory: Sensory deficit (dec vib sensation no ulcers) present.   Psychiatric:         Mood and Affect: Mood normal.         Behavior: Behavior normal.         Thought Content: Thought content normal.         Judgment: Judgment normal.         Assessment/Plan   Problem List Items Addressed This Visit       Diabetic polyneuropathy associated with type 2 diabetes mellitus (Multi)    Relevant Medications    gabapentin (Neurontin) 300 mg capsule    Type 2 diabetes mellitus with hyperglycemia, with long-term current use of insulin (Multi)    Relevant Medications    insulin lispro (HumaLOG) 100 unit/mL injection    Other Relevant Orders    Hemoglobin A1C    CBC    Comprehensive Metabolic Panel    Stage 3b chronic kidney disease (Multi)     Other Visit Diagnoses       Routine general medical examination at health care facility    -  Primary    Localized edema        Relevant Medications    furosemide (Lasix) 20 mg tablet

## 2024-06-06 DIAGNOSIS — R60.0 LOCALIZED EDEMA: ICD-10-CM

## 2024-06-06 RX ORDER — FUROSEMIDE 20 MG/1
20 TABLET ORAL DAILY
Qty: 30 TABLET | Refills: 2 | OUTPATIENT
Start: 2024-06-06 | End: 2025-06-06

## 2024-06-11 DIAGNOSIS — E11.42 DIABETIC POLYNEUROPATHY ASSOCIATED WITH TYPE 2 DIABETES MELLITUS (MULTI): ICD-10-CM

## 2024-06-11 DIAGNOSIS — R60.0 LOCALIZED EDEMA: ICD-10-CM

## 2024-06-11 RX ORDER — FUROSEMIDE 20 MG/1
20 TABLET ORAL DAILY
Qty: 30 TABLET | Refills: 3 | OUTPATIENT
Start: 2024-06-11 | End: 2025-06-11

## 2024-06-11 RX ORDER — GABAPENTIN 300 MG/1
300 CAPSULE ORAL NIGHTLY
Qty: 30 CAPSULE | Refills: 3 | OUTPATIENT
Start: 2024-06-11 | End: 2025-06-11

## 2024-07-15 DIAGNOSIS — Z79.4 TYPE 2 DIABETES MELLITUS WITH HYPERGLYCEMIA, WITH LONG-TERM CURRENT USE OF INSULIN (MULTI): ICD-10-CM

## 2024-07-15 DIAGNOSIS — E11.65 TYPE 2 DIABETES MELLITUS WITH HYPERGLYCEMIA, WITH LONG-TERM CURRENT USE OF INSULIN (MULTI): ICD-10-CM

## 2024-07-15 RX ORDER — METFORMIN HYDROCHLORIDE 500 MG/1
1000 TABLET, EXTENDED RELEASE ORAL
Qty: 180 TABLET | Refills: 3 | OUTPATIENT
Start: 2024-07-15

## 2024-07-17 ENCOUNTER — TELEPHONE (OUTPATIENT)
Dept: PRIMARY CARE | Facility: CLINIC | Age: 74
End: 2024-07-17
Payer: MEDICARE

## 2024-07-17 DIAGNOSIS — E11.65 TYPE 2 DIABETES MELLITUS WITH HYPERGLYCEMIA, WITH LONG-TERM CURRENT USE OF INSULIN (MULTI): ICD-10-CM

## 2024-07-17 DIAGNOSIS — Z79.4 TYPE 2 DIABETES MELLITUS WITH HYPERGLYCEMIA, WITH LONG-TERM CURRENT USE OF INSULIN (MULTI): ICD-10-CM

## 2024-07-17 DIAGNOSIS — M54.50 ACUTE BILATERAL LOW BACK PAIN, UNSPECIFIED WHETHER SCIATICA PRESENT: Primary | ICD-10-CM

## 2024-07-17 RX ORDER — CYCLOBENZAPRINE HCL 5 MG
5 TABLET ORAL 3 TIMES DAILY PRN
Qty: 30 TABLET | Refills: 0 | Status: SHIPPED | OUTPATIENT
Start: 2024-07-17 | End: 2025-03-14

## 2024-07-17 RX ORDER — METFORMIN HYDROCHLORIDE 500 MG/1
1000 TABLET, EXTENDED RELEASE ORAL
Qty: 180 TABLET | Refills: 3 | OUTPATIENT
Start: 2024-07-17

## 2024-07-17 NOTE — TELEPHONE ENCOUNTER
Flexeril 5 mg tid prn sent 
HE HAS LOWER BACK PAIN FOR THE LAST 2 WKS AND WOULD LIKE SOMETHING. HE HAS A APPT ON THE 7/30 AT 8:40. [email or web address] IS HIS PHARMACY. THANK YOU. HE WOULD LIKE A PHONE CALL.   
decreased sequencing ability/decreased step length/decreased weight-shifting ability

## 2024-07-30 ENCOUNTER — APPOINTMENT (OUTPATIENT)
Dept: PRIMARY CARE | Facility: CLINIC | Age: 74
End: 2024-07-30
Payer: MEDICARE

## 2024-07-30 ENCOUNTER — LAB (OUTPATIENT)
Dept: LAB | Facility: LAB | Age: 74
End: 2024-07-30
Payer: MEDICARE

## 2024-07-30 VITALS
BODY MASS INDEX: 43.88 KG/M2 | OXYGEN SATURATION: 94 % | SYSTOLIC BLOOD PRESSURE: 148 MMHG | WEIGHT: 276 LBS | HEART RATE: 116 BPM | DIASTOLIC BLOOD PRESSURE: 82 MMHG

## 2024-07-30 DIAGNOSIS — Z79.4 TYPE 2 DIABETES MELLITUS WITH HYPERGLYCEMIA, WITH LONG-TERM CURRENT USE OF INSULIN (MULTI): ICD-10-CM

## 2024-07-30 DIAGNOSIS — R60.0 LOCALIZED EDEMA: Primary | ICD-10-CM

## 2024-07-30 DIAGNOSIS — E11.42 DIABETIC POLYNEUROPATHY ASSOCIATED WITH TYPE 2 DIABETES MELLITUS (MULTI): ICD-10-CM

## 2024-07-30 DIAGNOSIS — E11.65 TYPE 2 DIABETES MELLITUS WITH HYPERGLYCEMIA, WITH LONG-TERM CURRENT USE OF INSULIN (MULTI): ICD-10-CM

## 2024-07-30 DIAGNOSIS — Z79.4 TYPE 2 DIABETES MELLITUS WITH HYPERGLYCEMIA, WITH LONG-TERM CURRENT USE OF INSULIN (MULTI): Primary | ICD-10-CM

## 2024-07-30 DIAGNOSIS — E11.65 TYPE 2 DIABETES MELLITUS WITH HYPERGLYCEMIA, WITH LONG-TERM CURRENT USE OF INSULIN (MULTI): Primary | ICD-10-CM

## 2024-07-30 LAB
ALBUMIN SERPL BCP-MCNC: 4 G/DL (ref 3.4–5)
ALP SERPL-CCNC: 57 U/L (ref 33–136)
ALT SERPL W P-5'-P-CCNC: 20 U/L (ref 10–52)
ANION GAP SERPL CALC-SCNC: 12 MMOL/L (ref 10–20)
AST SERPL W P-5'-P-CCNC: 18 U/L (ref 9–39)
BILIRUB SERPL-MCNC: 0.3 MG/DL (ref 0–1.2)
BUN SERPL-MCNC: 20 MG/DL (ref 6–23)
CALCIUM SERPL-MCNC: 9 MG/DL (ref 8.6–10.3)
CHLORIDE SERPL-SCNC: 99 MMOL/L (ref 98–107)
CO2 SERPL-SCNC: 29 MMOL/L (ref 21–32)
CREAT SERPL-MCNC: 1.4 MG/DL (ref 0.5–1.3)
EGFRCR SERPLBLD CKD-EPI 2021: 54 ML/MIN/1.73M*2
ERYTHROCYTE [DISTWIDTH] IN BLOOD BY AUTOMATED COUNT: 13.9 % (ref 11.5–14.5)
EST. AVERAGE GLUCOSE BLD GHB EST-MCNC: 206 MG/DL
GLUCOSE SERPL-MCNC: 257 MG/DL (ref 74–99)
HBA1C MFR BLD: 8.8 %
HCT VFR BLD AUTO: 42.7 % (ref 41–52)
HGB BLD-MCNC: 13.2 G/DL (ref 13.5–17.5)
MCH RBC QN AUTO: 27.6 PG (ref 26–34)
MCHC RBC AUTO-ENTMCNC: 30.9 G/DL (ref 32–36)
MCV RBC AUTO: 89 FL (ref 80–100)
NRBC BLD-RTO: 0 /100 WBCS (ref 0–0)
PLATELET # BLD AUTO: 289 X10*3/UL (ref 150–450)
POTASSIUM SERPL-SCNC: 4.4 MMOL/L (ref 3.5–5.3)
PROT SERPL-MCNC: 7.3 G/DL (ref 6.4–8.2)
RBC # BLD AUTO: 4.79 X10*6/UL (ref 4.5–5.9)
SODIUM SERPL-SCNC: 136 MMOL/L (ref 136–145)
WBC # BLD AUTO: 7.7 X10*3/UL (ref 4.4–11.3)

## 2024-07-30 PROCEDURE — 80053 COMPREHEN METABOLIC PANEL: CPT

## 2024-07-30 PROCEDURE — 85027 COMPLETE CBC AUTOMATED: CPT

## 2024-07-30 PROCEDURE — 83036 HEMOGLOBIN GLYCOSYLATED A1C: CPT

## 2024-07-30 PROCEDURE — 3046F HEMOGLOBIN A1C LEVEL >9.0%: CPT | Performed by: FAMILY MEDICINE

## 2024-07-30 PROCEDURE — 99214 OFFICE O/P EST MOD 30 MIN: CPT | Performed by: FAMILY MEDICINE

## 2024-07-30 PROCEDURE — 36415 COLL VENOUS BLD VENIPUNCTURE: CPT

## 2024-07-30 PROCEDURE — 3077F SYST BP >= 140 MM HG: CPT | Performed by: FAMILY MEDICINE

## 2024-07-30 PROCEDURE — 1159F MED LIST DOCD IN RCRD: CPT | Performed by: FAMILY MEDICINE

## 2024-07-30 PROCEDURE — 3079F DIAST BP 80-89 MM HG: CPT | Performed by: FAMILY MEDICINE

## 2024-07-30 RX ORDER — GABAPENTIN 300 MG/1
300 CAPSULE ORAL NIGHTLY
Qty: 30 CAPSULE | Refills: 2 | Status: SHIPPED | OUTPATIENT
Start: 2024-07-30 | End: 2025-07-30

## 2024-07-30 RX ORDER — BISACODYL 10 MG
SUPPOSITORY, RECTAL RECTAL
Qty: 1 EACH | Refills: 0 | Status: SHIPPED | OUTPATIENT
Start: 2024-07-30

## 2024-07-30 RX ORDER — INSULIN LISPRO 100 [IU]/ML
22 INJECTION, SOLUTION INTRAVENOUS; SUBCUTANEOUS
Qty: 30 ML | Refills: 11 | Status: SHIPPED | OUTPATIENT
Start: 2024-07-30 | End: 2025-07-30

## 2024-07-30 RX ORDER — INSULIN GLARGINE 300 [IU]/ML
60 INJECTION, SOLUTION SUBCUTANEOUS EVERY MORNING
Qty: 18 ML | Refills: 3 | Status: SHIPPED | OUTPATIENT
Start: 2024-07-30 | End: 2025-07-30

## 2024-07-30 NOTE — PROGRESS NOTES
Subjective   Patient ID: Yg Vergara is a 73 y.o. male who presents for Med Management (Legs swelling, weight gain).    HPI     Htn borderline  controlled on medications     Ckd 3b have  dec lasix from 40 to 20     Edema is much worse today         DM II   Poorly controlled    Will increase toujeo to 60 and increase mealtime to 22 + 2 per 50 over 150   States his CGM A1c is 8.5%   Will check labs today      Neruopathy both feet   Has seen podiatrist  toenails every 3 months   eye doctor yearly no visual changes     Right leg pain and red with swollen   Venous stasis needs to start  lasix again  Some BOATENG   No pnd   Does not heve ANA      LBP takes flexeril prn and is doing well      P20 refuses  multiple times   AAA screen states normal former smoker 2022 with Dr Oglesby        Review of Systems    Objective   /82 (BP Location: Left arm, Patient Position: Sitting)   Pulse (!) 116   Wt 125 kg (276 lb)   SpO2 94%   BMI 43.88 kg/m²     Physical Exam  Vitals reviewed.   Constitutional:       Appearance: Normal appearance.   HENT:      Head: Normocephalic and atraumatic.   Eyes:      Conjunctiva/sclera: Conjunctivae normal.   Cardiovascular:      Rate and Rhythm: Normal rate and regular rhythm.   Pulmonary:      Effort: Pulmonary effort is normal.      Breath sounds: Normal breath sounds.   Musculoskeletal:      Cervical back: Neck supple.      Right lower leg: Edema present.      Left lower leg: Edema present.      Comments: + with venous stasis    Skin:     General: Skin is warm and dry.   Neurological:      General: No focal deficit present.      Mental Status: He is alert and oriented to person, place, and time.   Psychiatric:         Mood and Affect: Mood normal.         Behavior: Behavior normal.         Thought Content: Thought content normal.         Judgment: Judgment normal.         Assessment/Plan   Diagnoses and all orders for this visit:  Localized edema  -     compression socks, x-large misc; Disp  1 pair measure to fit may need XL   Compression 15 to 30 mm HG  Diabetic polyneuropathy associated with type 2 diabetes mellitus (Multi)  -     gabapentin (Neurontin) 300 mg capsule; Take 1 capsule (300 mg) by mouth once daily at bedtime.  Type 2 diabetes mellitus with hyperglycemia, with long-term current use of insulin (Multi)  -     insulin lispro (HumaLOG) 100 unit/mL injection; Inject 22 Units under the skin 3 times daily (morning, midday, late afternoon). Take as directed per insulin instructions.  May take a maximum 32 units per meal  -     insulin glargine (Toujeo Max U-300 SoloStar) 300 unit/mL (3 mL) injection; Inject 60 Units under the skin once daily in the morning.  -     Lipid Panel; Future  -     Basic Metabolic Panel; Future

## 2024-08-02 DIAGNOSIS — Z79.4 TYPE 2 DIABETES MELLITUS WITH HYPERGLYCEMIA, WITH LONG-TERM CURRENT USE OF INSULIN (MULTI): ICD-10-CM

## 2024-08-02 DIAGNOSIS — E11.65 TYPE 2 DIABETES MELLITUS WITH HYPERGLYCEMIA, WITH LONG-TERM CURRENT USE OF INSULIN (MULTI): ICD-10-CM

## 2024-08-02 RX ORDER — METFORMIN HYDROCHLORIDE 500 MG/1
1000 TABLET, EXTENDED RELEASE ORAL
Qty: 180 TABLET | Refills: 3 | OUTPATIENT
Start: 2024-08-02

## 2024-08-07 ENCOUNTER — TELEPHONE (OUTPATIENT)
Dept: PRIMARY CARE | Facility: CLINIC | Age: 74
End: 2024-08-07
Payer: MEDICARE

## 2024-08-07 NOTE — TELEPHONE ENCOUNTER
Patient has refills on his metformin at [facility], sent a Tour Desk message to notify

## 2024-08-07 NOTE — TELEPHONE ENCOUNTER
Patient called in requesting a refill on the following medication....metFORMIN  mg 24 hr tablet ,,,, called into [facility] pharmacy in [place].

## 2024-08-09 ENCOUNTER — TELEPHONE (OUTPATIENT)
Dept: PRIMARY CARE | Facility: CLINIC | Age: 74
End: 2024-08-09
Payer: MEDICARE

## 2024-08-09 DIAGNOSIS — Z79.4 TYPE 2 DIABETES MELLITUS WITH HYPERGLYCEMIA, WITH LONG-TERM CURRENT USE OF INSULIN (MULTI): ICD-10-CM

## 2024-08-09 DIAGNOSIS — E11.65 TYPE 2 DIABETES MELLITUS WITH HYPERGLYCEMIA, WITH LONG-TERM CURRENT USE OF INSULIN (MULTI): ICD-10-CM

## 2024-08-12 RX ORDER — METFORMIN HYDROCHLORIDE 500 MG/1
1000 TABLET, EXTENDED RELEASE ORAL
Qty: 180 TABLET | Refills: 3 | Status: SHIPPED | OUTPATIENT
Start: 2024-08-12

## 2024-09-25 ENCOUNTER — TELEPHONE (OUTPATIENT)
Dept: PRIMARY CARE | Facility: CLINIC | Age: 74
End: 2024-09-25
Payer: MEDICARE

## 2024-09-25 DIAGNOSIS — Z79.4 TYPE 2 DIABETES MELLITUS WITH HYPERGLYCEMIA, WITH LONG-TERM CURRENT USE OF INSULIN: ICD-10-CM

## 2024-09-25 DIAGNOSIS — E11.65 TYPE 2 DIABETES MELLITUS WITH HYPERGLYCEMIA, WITH LONG-TERM CURRENT USE OF INSULIN: ICD-10-CM

## 2024-09-25 DIAGNOSIS — M54.50 ACUTE BILATERAL LOW BACK PAIN, UNSPECIFIED WHETHER SCIATICA PRESENT: ICD-10-CM

## 2024-09-25 NOTE — TELEPHONE ENCOUNTER
[place], [place] - [address]  [address] [place] [address]  Phone: [phone]  Fax: [phone]   Please refill:  FreeStyle Villa 2 Sensors

## 2024-09-26 RX ORDER — FLASH GLUCOSE SENSOR
1 KIT MISCELLANEOUS AS NEEDED
Qty: 1 EACH | Refills: 3 | Status: SHIPPED | OUTPATIENT
Start: 2024-09-26

## 2024-10-09 ENCOUNTER — LAB (OUTPATIENT)
Dept: LAB | Facility: LAB | Age: 74
End: 2024-10-09
Payer: MEDICARE

## 2024-10-09 DIAGNOSIS — E11.65 TYPE 2 DIABETES MELLITUS WITH HYPERGLYCEMIA, WITH LONG-TERM CURRENT USE OF INSULIN: ICD-10-CM

## 2024-10-09 DIAGNOSIS — Z79.4 TYPE 2 DIABETES MELLITUS WITH HYPERGLYCEMIA, WITH LONG-TERM CURRENT USE OF INSULIN: ICD-10-CM

## 2024-10-09 LAB
ANION GAP SERPL CALC-SCNC: 12 MMOL/L (ref 10–20)
BUN SERPL-MCNC: 20 MG/DL (ref 6–23)
CALCIUM SERPL-MCNC: 9.1 MG/DL (ref 8.6–10.3)
CHLORIDE SERPL-SCNC: 100 MMOL/L (ref 98–107)
CO2 SERPL-SCNC: 30 MMOL/L (ref 21–32)
CREAT SERPL-MCNC: 1.32 MG/DL (ref 0.5–1.3)
EGFRCR SERPLBLD CKD-EPI 2021: 57 ML/MIN/1.73M*2
EST. AVERAGE GLUCOSE BLD GHB EST-MCNC: 209 MG/DL
GLUCOSE SERPL-MCNC: 173 MG/DL (ref 74–99)
HBA1C MFR BLD: 8.9 %
POTASSIUM SERPL-SCNC: 4.4 MMOL/L (ref 3.5–5.3)
SODIUM SERPL-SCNC: 138 MMOL/L (ref 136–145)

## 2024-10-09 PROCEDURE — 83036 HEMOGLOBIN GLYCOSYLATED A1C: CPT

## 2024-10-09 PROCEDURE — 80048 BASIC METABOLIC PNL TOTAL CA: CPT

## 2024-10-09 PROCEDURE — 36415 COLL VENOUS BLD VENIPUNCTURE: CPT

## 2024-11-05 ENCOUNTER — TELEPHONE (OUTPATIENT)
Dept: PRIMARY CARE | Facility: CLINIC | Age: 74
End: 2024-11-05

## 2024-11-05 ENCOUNTER — APPOINTMENT (OUTPATIENT)
Dept: PRIMARY CARE | Facility: CLINIC | Age: 74
End: 2024-11-05
Payer: MEDICARE

## 2024-11-05 VITALS
DIASTOLIC BLOOD PRESSURE: 90 MMHG | WEIGHT: 285.4 LBS | BODY MASS INDEX: 45.37 KG/M2 | OXYGEN SATURATION: 99 % | SYSTOLIC BLOOD PRESSURE: 138 MMHG | HEART RATE: 81 BPM

## 2024-11-05 DIAGNOSIS — I71.40 ABDOMINAL AORTIC ANEURYSM (AAA) WITHOUT RUPTURE, UNSPECIFIED PART (CMS-HCC): ICD-10-CM

## 2024-11-05 DIAGNOSIS — E11.42 DIABETIC POLYNEUROPATHY ASSOCIATED WITH TYPE 2 DIABETES MELLITUS (MULTI): ICD-10-CM

## 2024-11-05 DIAGNOSIS — I10 PRIMARY HYPERTENSION: ICD-10-CM

## 2024-11-05 DIAGNOSIS — Z13.6 SCREENING FOR AAA (AORTIC ABDOMINAL ANEURYSM): Primary | ICD-10-CM

## 2024-11-05 DIAGNOSIS — E78.5 HYPERLIPIDEMIA, UNSPECIFIED HYPERLIPIDEMIA TYPE: ICD-10-CM

## 2024-11-05 DIAGNOSIS — Z79.4 TYPE 2 DIABETES MELLITUS WITH HYPERGLYCEMIA, WITH LONG-TERM CURRENT USE OF INSULIN: ICD-10-CM

## 2024-11-05 DIAGNOSIS — E11.65 TYPE 2 DIABETES MELLITUS WITH HYPERGLYCEMIA, WITH LONG-TERM CURRENT USE OF INSULIN: ICD-10-CM

## 2024-11-05 PROCEDURE — 3080F DIAST BP >= 90 MM HG: CPT | Performed by: FAMILY MEDICINE

## 2024-11-05 PROCEDURE — 3075F SYST BP GE 130 - 139MM HG: CPT | Performed by: FAMILY MEDICINE

## 2024-11-05 PROCEDURE — 99214 OFFICE O/P EST MOD 30 MIN: CPT | Performed by: FAMILY MEDICINE

## 2024-11-05 PROCEDURE — 3052F HG A1C>EQUAL 8.0%<EQUAL 9.0%: CPT | Performed by: FAMILY MEDICINE

## 2024-11-05 PROCEDURE — 1159F MED LIST DOCD IN RCRD: CPT | Performed by: FAMILY MEDICINE

## 2024-11-05 RX ORDER — LOVASTATIN 20 MG/1
20 TABLET ORAL NIGHTLY
Qty: 90 TABLET | Refills: 3 | Status: SHIPPED | OUTPATIENT
Start: 2024-11-05

## 2024-11-05 RX ORDER — INSULIN GLARGINE 300 [IU]/ML
76 INJECTION, SOLUTION SUBCUTANEOUS EVERY MORNING
Qty: 30 ML | Refills: 3 | Status: SHIPPED | OUTPATIENT
Start: 2024-11-05 | End: 2025-11-05

## 2024-11-05 RX ORDER — MAGNESIUM 250 MG
1 TABLET ORAL DAILY
COMMUNITY

## 2024-11-05 RX ORDER — INSULIN GLARGINE 300 [IU]/ML
75 INJECTION, SOLUTION SUBCUTANEOUS EVERY MORNING
Qty: 30 ML | Refills: 3 | Status: SHIPPED | OUTPATIENT
Start: 2024-11-05 | End: 2024-11-05

## 2024-11-05 RX ORDER — GABAPENTIN 300 MG/1
300 CAPSULE ORAL NIGHTLY
Qty: 30 CAPSULE | Refills: 2 | Status: SHIPPED | OUTPATIENT
Start: 2024-11-05 | End: 2025-11-05

## 2024-11-05 RX ORDER — LISINOPRIL AND HYDROCHLOROTHIAZIDE 12.5; 2 MG/1; MG/1
1 TABLET ORAL 2 TIMES DAILY
Qty: 90 TABLET | Refills: 3 | Status: SHIPPED | OUTPATIENT
Start: 2024-11-05

## 2024-11-05 ASSESSMENT — ENCOUNTER SYMPTOMS
SHORTNESS OF BREATH: 1
HYPERTENSION: 1

## 2024-11-05 NOTE — TELEPHONE ENCOUNTER
Ozempic is not available at 2 different pharmacies. [facility] has Mounjaro 12.5. Asking if he can take that one instead. Asking for a call back. [phone]

## 2024-11-05 NOTE — PROGRESS NOTES
Subjective   Patient ID: Yg Vergara is a 74 y.o. male who presents for Diabetes (Would like to go back to Ozempic or Mounjaro to help with weight loss), Hypertension, Hyperlipidemia, Diabetic polyneuropathy, and Shortness of Breath (With exertion ).    Diabetes    Hypertension  Associated symptoms include shortness of breath.   Hyperlipidemia  Associated symptoms include shortness of breath.   Shortness of Breath           Htn borderline  controlled on medications     Ckd 3b have  dec lasix from 40 to 20     Edema is much worse today  taking 20 mg restarted  3 days ago but was good for 3 moths   Egfr improved to 57  October 2024         DM II   Poorly controlled    Will increase toujeo to 76 and cont mealtime to 22 + 2 per 50 over 150  A1c  8.9   Will restart ozempic        Neruopathy both feet   Has seen podiatrist  toenails every 3 months   eye doctor yearly no visual changes      Alonso   Venous stasis had not been taking Lasix for several months restarted 2 days ago.  Some BOATENG   No pnd   Does not heve ANA         P20 refuses  multiple times   AAA 2019 had an ultrasound of the abdominal aorta that showed mild dilatation at 3.2 cm we will recheck      Review of Systems   Respiratory:  Positive for shortness of breath.        Objective   /90   Pulse 81   Wt 129 kg (285 lb 6.4 oz)   SpO2 99%   BMI 45.37 kg/m²     Physical Exam  Vitals reviewed.   Constitutional:       Appearance: Normal appearance.   HENT:      Head: Normocephalic and atraumatic.   Eyes:      Conjunctiva/sclera: Conjunctivae normal.   Cardiovascular:      Rate and Rhythm: Normal rate and regular rhythm.   Pulmonary:      Effort: Pulmonary effort is normal.      Breath sounds: Normal breath sounds.   Musculoskeletal:      Cervical back: Neck supple.      Right lower leg: Edema present.      Left lower leg: Edema present.   Skin:     General: Skin is warm and dry.   Neurological:      General: No focal deficit present.      Mental Status: He  is alert and oriented to person, place, and time.   Psychiatric:         Mood and Affect: Mood normal.         Behavior: Behavior normal.         Thought Content: Thought content normal.         Judgment: Judgment normal.         Assessment/Plan   Diagnoses and all orders for this visit:  Diabetic polyneuropathy associated with type 2 diabetes mellitus (Multi)  -     gabapentin (Neurontin) 300 mg capsule; Take 1 capsule (300 mg) by mouth once daily at bedtime.  Primary hypertension  -     lisinopriL-hydrochlorothiazide 20-12.5 mg tablet; Take 1 tablet by mouth 2 times a day.  Hyperlipidemia, unspecified hyperlipidemia type  -     lovastatin (Mevacor) 20 mg tablet; Take 1 tablet (20 mg) by mouth once daily at bedtime.  Type 2 diabetes mellitus with hyperglycemia, with long-term current use of insulin  -     insulin glargine (Toujeo Max U-300 SoloStar) 300 unit/mL (3 mL) injection; Inject 76 Units under the skin once daily in the morning.  -     Hemoglobin A1C; Future  -     semaglutide 0.25 mg or 0.5 mg (2 mg/3 mL) pen injector; Inject 0.5 mg under the skin 1 (one) time per week.

## 2024-11-06 DIAGNOSIS — E11.65 TYPE 2 DIABETES MELLITUS WITH HYPERGLYCEMIA, WITH LONG-TERM CURRENT USE OF INSULIN: Primary | ICD-10-CM

## 2024-11-06 DIAGNOSIS — Z79.4 TYPE 2 DIABETES MELLITUS WITH HYPERGLYCEMIA, WITH LONG-TERM CURRENT USE OF INSULIN: Primary | ICD-10-CM

## 2024-11-06 RX ORDER — TIRZEPATIDE 12.5 MG/.5ML
12.5 INJECTION, SOLUTION SUBCUTANEOUS WEEKLY
Qty: 2 ML | Refills: 2 | Status: SHIPPED | OUTPATIENT
Start: 2024-11-06

## 2024-11-08 ENCOUNTER — HOSPITAL ENCOUNTER (OUTPATIENT)
Dept: VASCULAR MEDICINE | Facility: HOSPITAL | Age: 74
Discharge: HOME | End: 2024-11-08
Payer: MEDICARE

## 2024-11-08 DIAGNOSIS — Z13.6 ENCOUNTER FOR SCREENING FOR CARDIOVASCULAR DISORDERS: ICD-10-CM

## 2024-11-08 DIAGNOSIS — I71.40 ABDOMINAL AORTIC ANEURYSM (AAA) WITHOUT RUPTURE, UNSPECIFIED PART (CMS-HCC): ICD-10-CM

## 2024-11-08 PROCEDURE — 76706 US ABDL AORTA SCREEN AAA: CPT | Performed by: INTERNAL MEDICINE

## 2024-11-08 PROCEDURE — 76706 US ABDL AORTA SCREEN AAA: CPT

## 2024-11-25 DIAGNOSIS — E11.65 TYPE 2 DIABETES MELLITUS WITH HYPERGLYCEMIA, WITH LONG-TERM CURRENT USE OF INSULIN: ICD-10-CM

## 2024-11-25 DIAGNOSIS — Z79.4 TYPE 2 DIABETES MELLITUS WITH HYPERGLYCEMIA, WITH LONG-TERM CURRENT USE OF INSULIN: ICD-10-CM

## 2024-11-25 RX ORDER — FLASH GLUCOSE SENSOR
1 KIT MISCELLANEOUS AS NEEDED
Qty: 1 EACH | Refills: 3 | Status: SHIPPED | OUTPATIENT
Start: 2024-11-25

## 2024-11-25 RX ORDER — TIRZEPATIDE 12.5 MG/.5ML
12.5 INJECTION, SOLUTION SUBCUTANEOUS WEEKLY
Qty: 2 ML | Refills: 2 | Status: SHIPPED | OUTPATIENT
Start: 2024-11-25

## 2024-11-25 NOTE — TELEPHONE ENCOUNTER
Patient called in for a new RX for his tanesha 2 sensors to be sent to [facility] pharmacy. And his Mounjaro to be sent to a new pharmacy [facility]. Both are pended for your approval if you agree.    LOV:11/5/25  NOV:2/6/25    IAIN DANGELO MA

## 2024-12-02 ENCOUNTER — TELEPHONE (OUTPATIENT)
Dept: PRIMARY CARE | Facility: CLINIC | Age: 74
End: 2024-12-02
Payer: MEDICARE

## 2024-12-02 DIAGNOSIS — Z79.4 TYPE 2 DIABETES MELLITUS WITH HYPERGLYCEMIA, WITH LONG-TERM CURRENT USE OF INSULIN: ICD-10-CM

## 2024-12-02 DIAGNOSIS — E11.65 TYPE 2 DIABETES MELLITUS WITH HYPERGLYCEMIA, WITH LONG-TERM CURRENT USE OF INSULIN: ICD-10-CM

## 2024-12-02 RX ORDER — METFORMIN HYDROCHLORIDE 500 MG/1
1000 TABLET, EXTENDED RELEASE ORAL
Qty: 120 TABLET | Refills: 0 | Status: SHIPPED | OUTPATIENT
Start: 2024-12-02

## 2025-01-30 LAB
EST. AVERAGE GLUCOSE BLD GHB EST-MCNC: 174 MG/DL
EST. AVERAGE GLUCOSE BLD GHB EST-SCNC: 9.7 MMOL/L
HBA1C MFR BLD: 7.7 % OF TOTAL HGB

## 2025-02-05 ENCOUNTER — APPOINTMENT (OUTPATIENT)
Dept: RADIOLOGY | Facility: HOSPITAL | Age: 75
End: 2025-02-05
Payer: MEDICARE

## 2025-02-05 ENCOUNTER — HOSPITAL ENCOUNTER (EMERGENCY)
Facility: HOSPITAL | Age: 75
Discharge: HOME | End: 2025-02-05
Payer: MEDICARE

## 2025-02-05 VITALS
RESPIRATION RATE: 18 BRPM | OXYGEN SATURATION: 96 % | TEMPERATURE: 98.3 F | HEART RATE: 85 BPM | DIASTOLIC BLOOD PRESSURE: 88 MMHG | SYSTOLIC BLOOD PRESSURE: 165 MMHG
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
## 2025-02-05 DIAGNOSIS — S61.452A CAT BITE OF LEFT HAND, INITIAL ENCOUNTER: Primary | ICD-10-CM

## 2025-02-05 DIAGNOSIS — S60.511A CAT SCRATCH OF HAND, RIGHT, INITIAL ENCOUNTER: ICD-10-CM
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
## 2025-02-05 LAB
ALBUMIN SERPL BCP-MCNC: 3.5 G/DL (ref 3.4–5)
ALP SERPL-CCNC: 58 U/L (ref 33–136)
ALT SERPL W P-5'-P-CCNC: 13 U/L (ref 10–52)
ANION GAP SERPL CALC-SCNC: 12 MMOL/L (ref 10–20)
AST SERPL W P-5'-P-CCNC: 12 U/L (ref 9–39)
BASOPHILS # BLD AUTO: 0.02 X10*3/UL (ref 0–0.1)
BASOPHILS NFR BLD AUTO: 0.2 %
BILIRUB SERPL-MCNC: 0.5 MG/DL (ref 0–1.2)
BUN SERPL-MCNC: 17 MG/DL (ref 6–23)
CALCIUM SERPL-MCNC: 8.7 MG/DL (ref 8.6–10.3)
CHLORIDE SERPL-SCNC: 97 MMOL/L (ref 98–107)
CO2 SERPL-SCNC: 28 MMOL/L (ref 21–32)
CREAT SERPL-MCNC: 1.36 MG/DL (ref 0.5–1.3)
EGFRCR SERPLBLD CKD-EPI 2021: 55 ML/MIN/1.73M*2
EOSINOPHIL # BLD AUTO: 0.25 X10*3/UL (ref 0–0.4)
EOSINOPHIL NFR BLD AUTO: 2.4 %
ERYTHROCYTE [DISTWIDTH] IN BLOOD BY AUTOMATED COUNT: 14.6 % (ref 11.5–14.5)
GLUCOSE BLD MANUAL STRIP-MCNC: 169 MG/DL (ref 74–99)
GLUCOSE SERPL-MCNC: 172 MG/DL (ref 74–99)
HCT VFR BLD AUTO: 40.9 % (ref 41–52)
HGB BLD-MCNC: 12.8 G/DL (ref 13.5–17.5)
IMM GRANULOCYTES # BLD AUTO: 0.02 X10*3/UL (ref 0–0.5)
IMM GRANULOCYTES NFR BLD AUTO: 0.2 % (ref 0–0.9)
LACTATE SERPL-SCNC: 1.5 MMOL/L (ref 0.4–2)
LYMPHOCYTES # BLD AUTO: 1.49 X10*3/UL (ref 0.8–3)
LYMPHOCYTES NFR BLD AUTO: 14.5 %
MCH RBC QN AUTO: 27.1 PG (ref 26–34)
MCHC RBC AUTO-ENTMCNC: 31.3 G/DL (ref 32–36)
MCV RBC AUTO: 87 FL (ref 80–100)
MONOCYTES # BLD AUTO: 0.96 X10*3/UL (ref 0.05–0.8)
MONOCYTES NFR BLD AUTO: 9.3 %
NEUTROPHILS # BLD AUTO: 7.56 X10*3/UL (ref 1.6–5.5)
NEUTROPHILS NFR BLD AUTO: 73.4 %
NRBC BLD-RTO: 0 /100 WBCS (ref 0–0)
PLATELET # BLD AUTO: 289 X10*3/UL (ref 150–450)
POTASSIUM SERPL-SCNC: 4.1 MMOL/L (ref 3.5–5.3)
PROT SERPL-MCNC: 7.1 G/DL (ref 6.4–8.2)
RBC # BLD AUTO: 4.73 X10*6/UL (ref 4.5–5.9)
SODIUM SERPL-SCNC: 133 MMOL/L (ref 136–145)
WBC # BLD AUTO: 10.3 X10*3/UL (ref 4.4–11.3)

## 2025-02-05 PROCEDURE — 99284 EMERGENCY DEPT VISIT MOD MDM: CPT | Mod: 25

## 2025-02-05 PROCEDURE — 85025 COMPLETE CBC W/AUTO DIFF WBC: CPT | Performed by: NURSE PRACTITIONER

## 2025-02-05 PROCEDURE — 96375 TX/PRO/DX INJ NEW DRUG ADDON: CPT

## 2025-02-05 PROCEDURE — 73130 X-RAY EXAM OF HAND: CPT | Mod: LEFT SIDE | Performed by: RADIOLOGY

## 2025-02-05 PROCEDURE — 36415 COLL VENOUS BLD VENIPUNCTURE: CPT | Performed by: NURSE PRACTITIONER

## 2025-02-05 PROCEDURE — 83605 ASSAY OF LACTIC ACID: CPT | Performed by: NURSE PRACTITIONER

## 2025-02-05 PROCEDURE — 87075 CULTR BACTERIA EXCEPT BLOOD: CPT | Mod: SAMLAB,59 | Performed by: NURSE PRACTITIONER

## 2025-02-05 PROCEDURE — 82947 ASSAY GLUCOSE BLOOD QUANT: CPT

## 2025-02-05 PROCEDURE — 96365 THER/PROPH/DIAG IV INF INIT: CPT

## 2025-02-05 PROCEDURE — 73130 X-RAY EXAM OF HAND: CPT | Mod: LT

## 2025-02-05 PROCEDURE — 84075 ASSAY ALKALINE PHOSPHATASE: CPT | Performed by: NURSE PRACTITIONER

## 2025-02-05 PROCEDURE — 2500000004 HC RX 250 GENERAL PHARMACY W/ HCPCS (ALT 636 FOR OP/ED): Performed by: NURSE PRACTITIONER

## 2025-02-05 RX ORDER — PEN NEEDLE, DIABETIC 31 GX5/16"
NEEDLE, DISPOSABLE MISCELLANEOUS
Refills: 11 | OUTPATIENT
Start: 2025-02-05

## 2025-02-05 RX ORDER — AMOXICILLIN AND CLAVULANATE POTASSIUM 875; 125 MG/1; MG/1
1 TABLET, FILM COATED ORAL EVERY 12 HOURS
Qty: 20 TABLET | Refills: 0 | Status: SHIPPED | OUTPATIENT
Start: 2025-02-05 | End: 2025-02-15

## 2025-02-05 RX ORDER — KETOROLAC TROMETHAMINE 30 MG/ML
15 INJECTION, SOLUTION INTRAMUSCULAR; INTRAVENOUS ONCE
Status: COMPLETED | OUTPATIENT
Start: 2025-02-05 | End: 2025-02-05

## 2025-02-05 RX ADMIN — KETOROLAC TROMETHAMINE 15 MG: 30 INJECTION, SOLUTION INTRAMUSCULAR at 11:53

## 2025-02-05 RX ADMIN — AMPICILLIN SODIUM AND SULBACTAM SODIUM 3 G: 2; 1 INJECTION, POWDER, FOR SOLUTION INTRAMUSCULAR; INTRAVENOUS at 11:29

## 2025-02-05 ASSESSMENT — COLUMBIA-SUICIDE SEVERITY RATING SCALE - C-SSRS
6. HAVE YOU EVER DONE ANYTHING, STARTED TO DO ANYTHING, OR PREPARED TO DO ANYTHING TO END YOUR LIFE?: NO
1. IN THE PAST MONTH, HAVE YOU WISHED YOU WERE DEAD OR WISHED YOU COULD GO TO SLEEP AND NOT WAKE UP?: NO
2. HAVE YOU ACTUALLY HAD ANY THOUGHTS OF KILLING YOURSELF?: NO

## 2025-02-05 ASSESSMENT — PAIN - FUNCTIONAL ASSESSMENT: PAIN_FUNCTIONAL_ASSESSMENT: 0-10

## 2025-02-05 ASSESSMENT — PAIN SCALES - GENERAL: PAINLEVEL_OUTOF10: 9

## 2025-02-05 NOTE — ED PROVIDER NOTES
Chief Complaint   Patient presents with    Animal Bite     Patient reports being bit by a cat on Sunday. Patient c/o swelling, redness and pain. Denies fever, N/V        Patient History    History reviewed. No pertinent past medical history.   Past Surgical History:   Procedure Laterality Date    APPENDECTOMY      HERNIA REPAIR        Family History   Problem Relation Name Age of Onset    Lymphoma Mother      Diabetes Father        Social History     Social History Narrative    Not on file      Allergies   Allergen Reactions    Amlodipine Swelling     Severe edema bilateral legs        PMH: Reviewed  PSH: Reviewed  Social History: Reviewed.   Allergies reviewed.     HPI: Yg Vergara is a right-handed 74 y.o. male who presents to the ED today accompanied by his wife with complaints of left hand cat bite.  Patient states that on Sunday, 3 days ago, his cat must have been startled by a dog that was in his shop and started to go spastic.  He states he picked the cat up to put it outside and it clotted his right hand and bit his left hand.  He has been using antibiotic ointment and soaking in Epsom salts over the past 3 days.  States his son had a leftover prescription of antibiotics, he believes it was Augmentin, and he took 2 doses of it just yesterday.  No fevers or chills.  Feels overall well.  Cat is unvaccinated.  Patient's tetanus is not up-to-date.    PHYSICAL EXAM:    GENERAL: Vitals noted, no distress. Alert and oriented x 3. Non-toxic.       HEAD: Normocephalic, atraumatic. Pupils equally round and reactive to light. EOMI.    NECK: Supple. No midline or paraspinal tenderness through full range of motion.      CARDIAC: Regular rate, rhythm. No murmurs or rubs.    RESPIRATORY: Lungs clear and equal bilaterally. No respiratory distress.     MUSCULOSKELETAL & SKIN: Right hand has two scabbed puncture/scrape wounds on the back of the hand without significant surrounding erythema or edema. No drainage. The left  hand is diffusely erythematous with puncture/bite wounds on the 2nd MCP. Edema extends from fingers to the midforearm. Radial pulse 2+, brachial pulse 2+. Otherwise, skin is warm, dry, and intact. No rash/lesions. No other peripheral edema.     NEURO: No focal neurologic deficits, acting appropriately.     Labs Reviewed   CBC WITH AUTO DIFFERENTIAL - Abnormal       Result Value    WBC 10.3      nRBC 0.0      RBC 4.73      Hemoglobin 12.8 (*)     Hematocrit 40.9 (*)     MCV 87      MCH 27.1      MCHC 31.3 (*)     RDW 14.6 (*)     Platelets 289      Neutrophils % 73.4      Immature Granulocytes %, Automated 0.2      Lymphocytes % 14.5      Monocytes % 9.3      Eosinophils % 2.4      Basophils % 0.2      Neutrophils Absolute 7.56 (*)     Immature Granulocytes Absolute, Automated 0.02      Lymphocytes Absolute 1.49      Monocytes Absolute 0.96 (*)     Eosinophils Absolute 0.25      Basophils Absolute 0.02     COMPREHENSIVE METABOLIC PANEL - Abnormal    Glucose 172 (*)     Sodium 133 (*)     Potassium 4.1      Chloride 97 (*)     Bicarbonate 28      Anion Gap 12      Urea Nitrogen 17      Creatinine 1.36 (*)     eGFR 55 (*)     Calcium 8.7      Albumin 3.5      Alkaline Phosphatase 58      Total Protein 7.1      AST 12      Bilirubin, Total 0.5      ALT 13     POCT GLUCOSE - Abnormal    POCT Glucose 169 (*)    LACTATE - Normal    Lactate 1.5      Narrative:     Venipuncture immediately after or during the administration of Metamizole may lead to falsely low results. Testing should be performed immediately prior to Metamizole dosing.   BLOOD CULTURE        XR hand left 3+ views   Final Result   Marked soft tissue swelling. No definite radiopaque foreign body or   soft tissue gas collection. Multifocal productive arthritic changes.        MACRO:   None.        Signed by: Diane Rivera 2/5/2025 11:41 AM   Dictation workstation:   [id number]           Medical Decision Making         ED COURSE: This patient was seen and  examined by myself independently. He is refusing tetanus update. Willing to have blood drawn and get a dose of IV antibiotics. Xray ordered. He denies significant pain. Workup as noted above, no significant leukocytosis or lactic acidosis noted. Xray with soft tissue swelling, no foreign body or gas collection. He was given Unasyn here. We discussed observation admission for continued rounds of IV antibiotics but he refused admission. He states he has an appt with his PCP tomorrow. I will send Augmentin prescription to his pharmacy of choice and have him seen by PCP tomorrow. Pictures of both hands placed in chart for review tomorrow at PCP office for comparison. He's aware he can return at any time for further evaluation. He is discharged home in a stable condition with computer instructions given and is encouraged to return to the ER for any new or worsening symptoms.       DIAGNOSTIC IMPRESSION: #1 left hand cat bite, infected #2 right hand cat scratch     Asha Jean, PATITO-PAOLO  02/05/25 0535

## 2025-02-06 ENCOUNTER — APPOINTMENT (OUTPATIENT)
Dept: PRIMARY CARE | Facility: CLINIC | Age: 75
End: 2025-02-06
Payer: MEDICARE

## 2025-02-06 VITALS
SYSTOLIC BLOOD PRESSURE: 132 MMHG | OXYGEN SATURATION: 92 % | BODY MASS INDEX: 44.2 KG/M2 | WEIGHT: 278 LBS | DIASTOLIC BLOOD PRESSURE: 88 MMHG | HEART RATE: 78 BPM

## 2025-02-06 DIAGNOSIS — E11.65 TYPE 2 DIABETES MELLITUS WITH HYPERGLYCEMIA, WITH LONG-TERM CURRENT USE OF INSULIN: ICD-10-CM

## 2025-02-06 DIAGNOSIS — M25.512 ACUTE PAIN OF LEFT SHOULDER: ICD-10-CM

## 2025-02-06 DIAGNOSIS — Z79.4 TYPE 2 DIABETES MELLITUS WITH HYPERGLYCEMIA, WITH LONG-TERM CURRENT USE OF INSULIN: ICD-10-CM

## 2025-02-06 DIAGNOSIS — Z12.5 ENCOUNTER FOR SCREENING FOR MALIGNANT NEOPLASM OF PROSTATE: Primary | ICD-10-CM

## 2025-02-06 DIAGNOSIS — I10 PRIMARY HYPERTENSION: ICD-10-CM
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
## 2025-02-06 DIAGNOSIS — E11.42 DIABETIC POLYNEUROPATHY ASSOCIATED WITH TYPE 2 DIABETES MELLITUS (MULTI): ICD-10-CM

## 2025-02-06 DIAGNOSIS — Z00.00 HEALTHCARE MAINTENANCE: ICD-10-CM

## 2025-02-06 PROCEDURE — 99214 OFFICE O/P EST MOD 30 MIN: CPT | Performed by: FAMILY MEDICINE

## 2025-02-06 PROCEDURE — 3075F SYST BP GE 130 - 139MM HG: CPT | Performed by: FAMILY MEDICINE

## 2025-02-06 PROCEDURE — 1036F TOBACCO NON-USER: CPT | Performed by: FAMILY MEDICINE

## 2025-02-06 PROCEDURE — 3079F DIAST BP 80-89 MM HG: CPT | Performed by: FAMILY MEDICINE

## 2025-02-06 PROCEDURE — 1159F MED LIST DOCD IN RCRD: CPT | Performed by: FAMILY MEDICINE

## 2025-02-06 RX ORDER — GABAPENTIN 300 MG/1
300 CAPSULE ORAL NIGHTLY
Qty: 30 CAPSULE | Refills: 2 | Status: CANCELLED | OUTPATIENT
Start: 2025-02-06 | End: 2026-02-06

## 2025-02-06 RX ORDER — METFORMIN HYDROCHLORIDE 500 MG/1
1000 TABLET, EXTENDED RELEASE ORAL
Qty: 120 TABLET | Refills: 3 | Status: SHIPPED | OUTPATIENT
Start: 2025-02-06

## 2025-02-06 RX ORDER — INSULIN LISPRO 100 [IU]/ML
20 INJECTION, SOLUTION INTRAVENOUS; SUBCUTANEOUS
Start: 2025-02-06 | End: 2026-02-06

## 2025-02-06 RX ORDER — INSULIN GLARGINE 300 [IU]/ML
60 INJECTION, SOLUTION SUBCUTANEOUS EVERY MORNING
Start: 2025-02-06 | End: 2026-02-06

## 2025-02-06 RX ORDER — LISINOPRIL AND HYDROCHLOROTHIAZIDE 12.5; 2 MG/1; MG/1
1 TABLET ORAL DAILY
Start: 2025-02-06

## 2025-02-06 RX ORDER — PEN NEEDLE, DIABETIC 30 GX3/16"
NEEDLE, DISPOSABLE MISCELLANEOUS
Qty: 100 EACH | Refills: 11 | Status: SHIPPED | OUTPATIENT
Start: 2025-02-06 | End: 2026-02-06

## 2025-02-06 RX ORDER — TIRZEPATIDE 12.5 MG/.5ML
12.5 INJECTION, SOLUTION SUBCUTANEOUS WEEKLY
Qty: 2 ML | Refills: 2 | Status: SHIPPED | OUTPATIENT
Start: 2025-02-06

## 2025-02-06 ASSESSMENT — ENCOUNTER SYMPTOMS: FEVER: 0

## 2025-02-06 ASSESSMENT — PATIENT HEALTH QUESTIONNAIRE - PHQ9
2. FEELING DOWN, DEPRESSED OR HOPELESS: NOT AT ALL
1. LITTLE INTEREST OR PLEASURE IN DOING THINGS: NOT AT ALL
SUM OF ALL RESPONSES TO PHQ9 QUESTIONS 1 AND 2: 0

## 2025-02-06 NOTE — PROGRESS NOTES
Subjective   Patient ID: Yg Vergara is a 74 y.o. male who presents for Follow-up (3 month, ER follow up on hand injury ).    HPI   Bite.  Seen in ER yesterday blood culture is no growth to date  Lactate CMP CBC were reviewed patient's EGFR was 55  Is getting better since yesterday   Tolerating augmentin well     Htn controlled on medications     Ckd 3b have  dec lasix from 40 to 20   Egfr improved to 57  October 2024         DM II    Will increase toujeo to 60 and cont mealtime to 20 + 2 per 50 over 150  A1c has improved to 7.7%.  Tolerating  mounjaro         Neruopathy both feet   Has seen podiatrist  toenails every 3 months   eye doctor yearly no visual changes   Takes gabapentin prn      Edema  Lasix 20 mg once a day   Some BOATENG no changes   No pnd   Does not wear compression sock     Fell on ice 2 weeks ago and left arm shoulder was painful taking advil          AAA 2019 had an ultrasound of the abdominal aorta that showed mild dilatation at 3.2 cm we will recheck    Review of Systems   Constitutional:  Negative for fever.   Cardiovascular:  Negative for chest pain.       Objective   /88 (BP Location: Right arm, Patient Position: Sitting)   Pulse 78   Wt 126 kg (278 lb)   SpO2 92%   BMI 44.20 kg/m²     Physical Exam  Constitutional:       Appearance: Normal appearance.   HENT:      Head: Normocephalic and atraumatic.   Eyes:      Conjunctiva/sclera: Conjunctivae normal.      Pupils: Pupils are equal, round, and reactive to light.   Cardiovascular:      Rate and Rhythm: Normal rate and regular rhythm.      Heart sounds: Normal heart sounds.   Pulmonary:      Effort: Pulmonary effort is normal.      Breath sounds: Normal breath sounds.   Musculoskeletal:      Right lower leg: Edema present.      Left lower leg: Edema present.      Comments: 2+= jorge  Left hand with dorsal redness swelling and dec rom of the 2nd PIP joint min tenderness to palpation     Left shoulder dec rom      Lymphadenopathy:       "Cervical: No cervical adenopathy.   Skin:     Coloration: Skin is not jaundiced.   Neurological:      General: No focal deficit present.      Mental Status: He is alert and oriented to person, place, and time.   Psychiatric:         Mood and Affect: Mood normal.         Behavior: Behavior normal.         Thought Content: Thought content normal.         Judgment: Judgment normal.         Assessment/Plan   Diagnoses and all orders for this visit:  Encounter for screening for malignant neoplasm of prostate  -     Prostate Specific Antigen, Screen; Future  Diabetic polyneuropathy associated with type 2 diabetes mellitus (Multi)  Type 2 diabetes mellitus with hyperglycemia, with long-term current use of insulin  -     metFORMIN  mg 24 hr tablet; Take 2 tablets (1,000 mg) by mouth 2 times daily (morning and late afternoon).  -     tirzepatide (Mounjaro) 12.5 mg/0.5 mL pen injector; Inject 12.5 mg under the skin 1 (one) time per week.  -     pen needle, diabetic 31 gauge x 5/16\" needle; Use to inject 1-4 times daily as directed.  -     Hemoglobin A1C; Future  -     Basic Metabolic Panel; Future  -     Albumin-Creatinine Ratio, Urine Random; Future  -     Lipid panel; Future  Type 2 diabetes mellitus with hyperglycemia, with long-term current use of insulin (Multi)  -     metFORMIN  mg 24 hr tablet; Take 2 tablets (1,000 mg) by mouth 2 times daily (morning and late afternoon).  -     tirzepatide (Mounjaro) 12.5 mg/0.5 mL pen injector; Inject 12.5 mg under the skin 1 (one) time per week.  -     pen needle, diabetic 31 gauge x 5/16\" needle; Use to inject 1-4 times daily as directed.  -     Hemoglobin A1C; Future  -     Basic Metabolic Panel; Future  -     Albumin-Creatinine Ratio, Urine Random; Future  -     Lipid panel; Future  Healthcare maintenance  -     Prostate Specific Antigen, Screen; Future  Cat bite, subsequent encounter  Acute pain of left shoulder       Continue antibiotics if worsens go back to the ER " for another IV dose of Ancef Home exercises given for left shoulder pain.

## 2025-02-09 LAB — BACTERIA BLD CULT: NORMAL

## 2025-02-11 ENCOUNTER — TELEPHONE (OUTPATIENT)
Dept: PRIMARY CARE | Facility: CLINIC | Age: 75
End: 2025-02-11
Payer: MEDICARE

## 2025-02-11 DIAGNOSIS — E11.65 TYPE 2 DIABETES MELLITUS WITH HYPERGLYCEMIA, WITH LONG-TERM CURRENT USE OF INSULIN: ICD-10-CM

## 2025-02-11 DIAGNOSIS — Z79.4 TYPE 2 DIABETES MELLITUS WITH HYPERGLYCEMIA, WITH LONG-TERM CURRENT USE OF INSULIN: ICD-10-CM

## 2025-02-11 RX ORDER — TIRZEPATIDE 12.5 MG/.5ML
12.5 INJECTION, SOLUTION SUBCUTANEOUS WEEKLY
Qty: 2 ML | Refills: 2 | Status: SHIPPED | OUTPATIENT
Start: 2025-02-11

## 2025-02-11 NOTE — TELEPHONE ENCOUNTER
Was seen last week and when his Mounjaro Rx was sent in, it went to [facility].  [facility] [place].  [facility]

## 2025-03-07 DIAGNOSIS — I10 PRIMARY HYPERTENSION: ICD-10-CM

## 2025-03-07 DIAGNOSIS — N40.1 BENIGN PROSTATIC HYPERPLASIA WITH NOCTURIA: ICD-10-CM

## 2025-03-07 DIAGNOSIS — E11.42 DIABETIC POLYNEUROPATHY ASSOCIATED WITH TYPE 2 DIABETES MELLITUS (MULTI): ICD-10-CM

## 2025-03-07 DIAGNOSIS — R35.1 BENIGN PROSTATIC HYPERPLASIA WITH NOCTURIA: ICD-10-CM

## 2025-03-10 RX ORDER — LISINOPRIL AND HYDROCHLOROTHIAZIDE 12.5; 2 MG/1; MG/1
1 TABLET ORAL 2 TIMES DAILY
Qty: 90 TABLET | Refills: 3 | OUTPATIENT
Start: 2025-03-10

## 2025-03-10 RX ORDER — TAMSULOSIN HYDROCHLORIDE 0.4 MG/1
0.4 CAPSULE ORAL DAILY
Qty: 90 CAPSULE | Refills: 3 | OUTPATIENT
Start: 2025-03-10

## 2025-03-10 RX ORDER — GABAPENTIN 300 MG/1
300 CAPSULE ORAL NIGHTLY
Qty: 30 CAPSULE | Refills: 3 | OUTPATIENT
Start: 2025-03-10 | End: 2026-03-10

## 2025-03-21 NOTE — TELEPHONE ENCOUNTER
Medication Refill Request    Medication:  Mounjaro    Pharmacy:  [facility] pharmacy    Last OV:  12/27/23  Upcoming appointment:  1/30/24    ORDER PENDED       c/w oxy 2.5 IR q6 ATC as tolerated, d/c if pt loses oral route  as goals now are for comfort:   changed to Dilaudid IVP 0.2mg q2 hours prn for moderate pain   started Dilaudid 0.5mg q2 hours prn for severe pain   c/w 100mg Gabapentin TID as tolerated   Tylenol for mild/moderate pain

## 2025-04-17 ENCOUNTER — TELEPHONE (OUTPATIENT)
Dept: PRIMARY CARE | Facility: CLINIC | Age: 75
End: 2025-04-17
Payer: MEDICARE

## 2025-04-17 DIAGNOSIS — N40.1 BENIGN PROSTATIC HYPERPLASIA WITH NOCTURIA: ICD-10-CM

## 2025-04-17 DIAGNOSIS — R35.1 BENIGN PROSTATIC HYPERPLASIA WITH NOCTURIA: ICD-10-CM

## 2025-04-17 RX ORDER — TAMSULOSIN HYDROCHLORIDE 0.4 MG/1
0.4 CAPSULE ORAL DAILY
Qty: 90 CAPSULE | Refills: 3 | Status: SHIPPED | OUTPATIENT
Start: 2025-04-17

## 2025-04-17 RX ORDER — TAMSULOSIN HYDROCHLORIDE 0.4 MG/1
0.4 CAPSULE ORAL DAILY
Qty: 90 CAPSULE | Refills: 3 | OUTPATIENT
Start: 2025-04-17

## 2025-05-19 ENCOUNTER — TELEPHONE (OUTPATIENT)
Dept: PRIMARY CARE | Facility: CLINIC | Age: 75
End: 2025-05-19
Payer: MEDICARE

## 2025-05-19 DIAGNOSIS — E11.65 TYPE 2 DIABETES MELLITUS WITH HYPERGLYCEMIA, WITH LONG-TERM CURRENT USE OF INSULIN: ICD-10-CM

## 2025-05-19 DIAGNOSIS — Z79.4 TYPE 2 DIABETES MELLITUS WITH HYPERGLYCEMIA, WITH LONG-TERM CURRENT USE OF INSULIN: ICD-10-CM

## 2025-05-19 RX ORDER — TIRZEPATIDE 12.5 MG/.5ML
12.5 INJECTION, SOLUTION SUBCUTANEOUS WEEKLY
Qty: 2 ML | Refills: 2 | Status: SHIPPED | OUTPATIENT
Start: 2025-05-19

## 2025-06-01 ENCOUNTER — TELEPHONE (OUTPATIENT)
Dept: PRIMARY CARE | Facility: CLINIC | Age: 75
End: 2025-06-01
Payer: MEDICARE

## 2025-06-01 DIAGNOSIS — E11.65 TYPE 2 DIABETES MELLITUS WITH HYPERGLYCEMIA, WITH LONG-TERM CURRENT USE OF INSULIN: ICD-10-CM

## 2025-06-01 DIAGNOSIS — Z79.4 TYPE 2 DIABETES MELLITUS WITH HYPERGLYCEMIA, WITH LONG-TERM CURRENT USE OF INSULIN: ICD-10-CM

## 2025-06-02 RX ORDER — METFORMIN HYDROCHLORIDE 500 MG/1
1000 TABLET, EXTENDED RELEASE ORAL 2 TIMES DAILY
Qty: 120 TABLET | Refills: 3 | OUTPATIENT
Start: 2025-06-02

## 2025-06-06 DIAGNOSIS — Z79.4 TYPE 2 DIABETES MELLITUS WITH HYPERGLYCEMIA, WITH LONG-TERM CURRENT USE OF INSULIN: ICD-10-CM

## 2025-06-06 DIAGNOSIS — E11.65 TYPE 2 DIABETES MELLITUS WITH HYPERGLYCEMIA, WITH LONG-TERM CURRENT USE OF INSULIN: ICD-10-CM

## 2025-06-06 RX ORDER — INSULIN GLARGINE 300 [IU]/ML
60 INJECTION, SOLUTION SUBCUTANEOUS EVERY MORNING
Qty: 1.5 ML | Refills: 11 | Status: SHIPPED | OUTPATIENT
Start: 2025-06-06

## 2025-06-06 RX ORDER — METFORMIN HYDROCHLORIDE 500 MG/1
1000 TABLET, EXTENDED RELEASE ORAL
Qty: 120 TABLET | Refills: 3 | Status: SHIPPED | OUTPATIENT
Start: 2025-06-06

## 2025-06-06 NOTE — TELEPHONE ENCOUNTER
Message from patient that [facility] isn't able to get his Toujeo.  Would like a Rx sent to [facility] in [place]

## 2025-06-06 NOTE — TELEPHONE ENCOUNTER
Rx proposed  Patient called back and requested his Metformin be renewed and sent to [facility]

## 2025-07-05 LAB
ALBUMIN/CREAT UR: 234 MG/G CREAT
ANION GAP SERPL CALCULATED.4IONS-SCNC: 9 MMOL/L (CALC) (ref 7–17)
BUN SERPL-MCNC: 19 MG/DL (ref 7–25)
BUN/CREAT SERPL: ABNORMAL (CALC) (ref 6–22)
CALCIUM SERPL-MCNC: 8.6 MG/DL (ref 8.6–10.3)
CHLORIDE SERPL-SCNC: 99 MMOL/L (ref 98–110)
CHOLEST SERPL-MCNC: 116 MG/DL
CHOLEST/HDLC SERPL: 3.1 (CALC)
CO2 SERPL-SCNC: 30 MMOL/L (ref 20–32)
CREAT SERPL-MCNC: 1.26 MG/DL (ref 0.7–1.28)
CREAT UR-MCNC: 103 MG/DL (ref 20–320)
EGFRCR SERPLBLD CKD-EPI 2021: 61 ML/MIN/1.73M2
EST. AVERAGE GLUCOSE BLD GHB EST-MCNC: 157 MG/DL
EST. AVERAGE GLUCOSE BLD GHB EST-SCNC: 8.7 MMOL/L
GLUCOSE SERPL-MCNC: 125 MG/DL (ref 65–99)
HBA1C MFR BLD: 7.1 %
HDLC SERPL-MCNC: 37 MG/DL
LDLC SERPL CALC-MCNC: 60 MG/DL (CALC)
MICROALBUMIN UR-MCNC: 24.1 MG/DL
NONHDLC SERPL-MCNC: 79 MG/DL (CALC)
POTASSIUM SERPL-SCNC: 4.6 MMOL/L (ref 3.5–5.3)
PSA SERPL-MCNC: 1.56 NG/ML
SODIUM SERPL-SCNC: 138 MMOL/L (ref 135–146)
TRIGL SERPL-MCNC: 105 MG/DL

## 2025-07-06 DIAGNOSIS — Z79.4 TYPE 2 DIABETES MELLITUS WITH HYPERGLYCEMIA, WITH LONG-TERM CURRENT USE OF INSULIN: ICD-10-CM

## 2025-07-06 DIAGNOSIS — E11.65 TYPE 2 DIABETES MELLITUS WITH HYPERGLYCEMIA, WITH LONG-TERM CURRENT USE OF INSULIN: ICD-10-CM

## 2025-07-06 DIAGNOSIS — Z12.5 ENCOUNTER FOR SCREENING FOR MALIGNANT NEOPLASM OF PROSTATE: ICD-10-CM

## 2025-07-06 DIAGNOSIS — Z00.00 HEALTHCARE MAINTENANCE: ICD-10-CM

## 2025-07-09 DIAGNOSIS — I10 PRIMARY HYPERTENSION: ICD-10-CM

## 2025-07-09 RX ORDER — LISINOPRIL AND HYDROCHLOROTHIAZIDE 12.5; 2 MG/1; MG/1
1 TABLET ORAL DAILY
Qty: 90 TABLET | Refills: 1 | Status: SHIPPED | OUTPATIENT
Start: 2025-07-09

## 2025-07-09 NOTE — TELEPHONE ENCOUNTER
Patient called in and left  for a refill on his lisinopril-hydrochlorothiazide. Medication is pended.

## 2025-07-15 ENCOUNTER — TELEPHONE (OUTPATIENT)
Dept: PRIMARY CARE | Facility: CLINIC | Age: 75
End: 2025-07-15
Payer: MEDICARE

## 2025-07-15 DIAGNOSIS — E11.65 TYPE 2 DIABETES MELLITUS WITH HYPERGLYCEMIA, WITH LONG-TERM CURRENT USE OF INSULIN: ICD-10-CM

## 2025-07-15 DIAGNOSIS — Z79.4 TYPE 2 DIABETES MELLITUS WITH HYPERGLYCEMIA, WITH LONG-TERM CURRENT USE OF INSULIN: ICD-10-CM

## 2025-07-15 RX ORDER — FLASH GLUCOSE SENSOR
1 KIT MISCELLANEOUS AS NEEDED
Qty: 1 EACH | Refills: 5 | Status: SHIPPED | OUTPATIENT
Start: 2025-07-15 | End: 2025-07-18 | Stop reason: WASHOUT

## 2025-07-16 ENCOUNTER — TELEPHONE (OUTPATIENT)
Dept: PRIMARY CARE | Facility: CLINIC | Age: 75
End: 2025-07-16
Payer: MEDICARE

## 2025-07-16 NOTE — TELEPHONE ENCOUNTER
Patient called in asking for the Villa 2+ or 3+ to be sent to his pharmacy. He needs the reader and the sensors. I am unable to pull them up for some reason. [facility] [place].

## 2025-07-17 DIAGNOSIS — Z79.4 TYPE 2 DIABETES MELLITUS WITH HYPERGLYCEMIA, WITH LONG-TERM CURRENT USE OF INSULIN: Primary | ICD-10-CM

## 2025-07-17 DIAGNOSIS — E11.65 TYPE 2 DIABETES MELLITUS WITH HYPERGLYCEMIA, WITH LONG-TERM CURRENT USE OF INSULIN: Primary | ICD-10-CM

## 2025-07-17 RX ORDER — BLOOD-GLUCOSE,RECEIVER,CONT
EACH MISCELLANEOUS
Qty: 1 EACH | Refills: 0 | Status: SHIPPED | OUTPATIENT
Start: 2025-07-17

## 2025-07-17 RX ORDER — BLOOD-GLUCOSE SENSOR
EACH MISCELLANEOUS
Qty: 2 EACH | Refills: 11 | Status: SHIPPED | OUTPATIENT
Start: 2025-07-17 | End: 2025-07-18 | Stop reason: SDUPTHER

## 2025-07-18 DIAGNOSIS — Z79.4 TYPE 2 DIABETES MELLITUS WITH HYPERGLYCEMIA, WITH LONG-TERM CURRENT USE OF INSULIN: ICD-10-CM

## 2025-07-18 DIAGNOSIS — E11.65 TYPE 2 DIABETES MELLITUS WITH HYPERGLYCEMIA, WITH LONG-TERM CURRENT USE OF INSULIN: ICD-10-CM

## 2025-07-18 RX ORDER — BLOOD-GLUCOSE SENSOR
EACH MISCELLANEOUS
Qty: 2 EACH | Refills: 11 | Status: SHIPPED | OUTPATIENT
Start: 2025-07-18

## 2025-07-29 ENCOUNTER — TELEPHONE (OUTPATIENT)
Dept: PRIMARY CARE | Facility: CLINIC | Age: 75
End: 2025-07-29
Payer: MEDICARE

## 2025-07-29 DIAGNOSIS — E11.65 TYPE 2 DIABETES MELLITUS WITH HYPERGLYCEMIA, WITH LONG-TERM CURRENT USE OF INSULIN: ICD-10-CM

## 2025-07-29 DIAGNOSIS — Z79.4 TYPE 2 DIABETES MELLITUS WITH HYPERGLYCEMIA, WITH LONG-TERM CURRENT USE OF INSULIN: ICD-10-CM

## 2025-07-29 RX ORDER — FLASH GLUCOSE SENSOR
1 KIT MISCELLANEOUS AS NEEDED
Refills: 5 | OUTPATIENT
Start: 2025-07-29

## 2025-07-30 DIAGNOSIS — Z79.4 TYPE 2 DIABETES MELLITUS WITH HYPERGLYCEMIA, WITH LONG-TERM CURRENT USE OF INSULIN: ICD-10-CM

## 2025-07-30 DIAGNOSIS — E11.65 TYPE 2 DIABETES MELLITUS WITH HYPERGLYCEMIA, WITH LONG-TERM CURRENT USE OF INSULIN: ICD-10-CM

## 2025-07-30 RX ORDER — BLOOD-GLUCOSE,RECEIVER,CONT
EACH MISCELLANEOUS
Qty: 1 EACH | Refills: 0 | Status: SHIPPED | OUTPATIENT
Start: 2025-07-30

## 2025-07-30 RX ORDER — BLOOD-GLUCOSE SENSOR
EACH MISCELLANEOUS
Qty: 2 EACH | Refills: 11 | Status: SHIPPED | OUTPATIENT
Start: 2025-07-30

## 2025-08-05 RX ORDER — BROMFENAC 0.76 MG/ML
1 SOLUTION/ DROPS OPHTHALMIC DAILY
COMMUNITY
Start: 2025-07-16

## 2025-08-05 RX ORDER — PREDNISOLONE ACETATE 10 MG/ML
1 SUSPENSION/ DROPS OPHTHALMIC 3 TIMES DAILY
COMMUNITY
Start: 2025-06-30

## 2025-08-05 RX ORDER — OFLOXACIN 3 MG/ML
1 SOLUTION/ DROPS OPHTHALMIC 4 TIMES DAILY
COMMUNITY
Start: 2025-07-16

## 2025-08-06 DIAGNOSIS — Z12.11 SCREENING FOR COLORECTAL CANCER: ICD-10-CM

## 2025-08-06 DIAGNOSIS — Z12.12 SCREENING FOR COLORECTAL CANCER: ICD-10-CM

## 2025-08-07 ENCOUNTER — APPOINTMENT (OUTPATIENT)
Dept: PRIMARY CARE | Facility: CLINIC | Age: 75
End: 2025-08-07
Payer: MEDICARE

## 2025-08-07 VITALS
OXYGEN SATURATION: 95 % | DIASTOLIC BLOOD PRESSURE: 98 MMHG | WEIGHT: 257 LBS | HEART RATE: 104 BPM | SYSTOLIC BLOOD PRESSURE: 150 MMHG | BODY MASS INDEX: 40.86 KG/M2

## 2025-08-07 DIAGNOSIS — R60.0 LOCALIZED EDEMA: ICD-10-CM

## 2025-08-07 DIAGNOSIS — E11.42 DIABETIC POLYNEUROPATHY ASSOCIATED WITH TYPE 2 DIABETES MELLITUS: ICD-10-CM

## 2025-08-07 DIAGNOSIS — E11.65 TYPE 2 DIABETES MELLITUS WITH HYPERGLYCEMIA, WITH LONG-TERM CURRENT USE OF INSULIN: ICD-10-CM

## 2025-08-07 DIAGNOSIS — E66.01 OBESITY, MORBID (MULTI): ICD-10-CM

## 2025-08-07 DIAGNOSIS — R35.1 BENIGN PROSTATIC HYPERPLASIA WITH NOCTURIA: ICD-10-CM

## 2025-08-07 DIAGNOSIS — N40.1 BENIGN PROSTATIC HYPERPLASIA WITH NOCTURIA: ICD-10-CM

## 2025-08-07 DIAGNOSIS — N18.32 STAGE 3B CHRONIC KIDNEY DISEASE (MULTI): ICD-10-CM

## 2025-08-07 DIAGNOSIS — Z00.00 ROUTINE GENERAL MEDICAL EXAMINATION AT HEALTH CARE FACILITY: Primary | ICD-10-CM

## 2025-08-07 DIAGNOSIS — Z79.4 TYPE 2 DIABETES MELLITUS WITH HYPERGLYCEMIA, WITH LONG-TERM CURRENT USE OF INSULIN: ICD-10-CM

## 2025-08-07 DIAGNOSIS — I10 PRIMARY HYPERTENSION: ICD-10-CM

## 2025-08-07 PROCEDURE — 99214 OFFICE O/P EST MOD 30 MIN: CPT | Performed by: FAMILY MEDICINE

## 2025-08-07 PROCEDURE — 1159F MED LIST DOCD IN RCRD: CPT | Performed by: FAMILY MEDICINE

## 2025-08-07 PROCEDURE — 3080F DIAST BP >= 90 MM HG: CPT | Performed by: FAMILY MEDICINE

## 2025-08-07 PROCEDURE — G0439 PPPS, SUBSEQ VISIT: HCPCS | Performed by: FAMILY MEDICINE

## 2025-08-07 PROCEDURE — 3077F SYST BP >= 140 MM HG: CPT | Performed by: FAMILY MEDICINE

## 2025-08-07 PROCEDURE — 4010F ACE/ARB THERAPY RXD/TAKEN: CPT | Performed by: FAMILY MEDICINE

## 2025-08-07 PROCEDURE — 1170F FXNL STATUS ASSESSED: CPT | Performed by: FAMILY MEDICINE

## 2025-08-07 RX ORDER — LISINOPRIL 40 MG/1
40 TABLET ORAL DAILY
Qty: 100 TABLET | Refills: 3 | Status: SHIPPED | OUTPATIENT
Start: 2025-08-07 | End: 2026-09-11

## 2025-08-07 RX ORDER — INSULIN LISPRO 100 [IU]/ML
20 INJECTION, SOLUTION INTRAVENOUS; SUBCUTANEOUS
Qty: 60 ML | Refills: 3 | Status: SHIPPED | OUTPATIENT
Start: 2025-08-07 | End: 2026-08-07

## 2025-08-07 RX ORDER — INSULIN LISPRO 100 [IU]/ML
22 INJECTION, SOLUTION INTRAVENOUS; SUBCUTANEOUS
COMMUNITY
Start: 2025-06-06 | End: 2025-08-07 | Stop reason: SDUPTHER

## 2025-08-07 RX ORDER — INSULIN GLARGINE 300 [IU]/ML
30 INJECTION, SOLUTION SUBCUTANEOUS EVERY MORNING
Qty: 9 ML | Refills: 3 | Status: SHIPPED | OUTPATIENT
Start: 2025-08-07 | End: 2026-08-07

## 2025-08-07 RX ORDER — FUROSEMIDE 20 MG/1
20 TABLET ORAL DAILY PRN
Status: CANCELLED | OUTPATIENT
Start: 2025-08-07

## 2025-08-07 RX ORDER — HYDROCHLOROTHIAZIDE 12.5 MG/1
12.5 TABLET ORAL DAILY
Qty: 90 TABLET | Refills: 3 | Status: SHIPPED | OUTPATIENT
Start: 2025-08-07 | End: 2026-08-07

## 2025-08-07 RX ORDER — TAMSULOSIN HYDROCHLORIDE 0.4 MG/1
0.8 CAPSULE ORAL NIGHTLY
Qty: 180 CAPSULE | Refills: 3 | Status: SHIPPED | OUTPATIENT
Start: 2025-08-07 | End: 2026-08-07

## 2025-08-07 RX ORDER — INSULIN LISPRO 100 [IU]/ML
20 INJECTION, SOLUTION INTRAVENOUS; SUBCUTANEOUS
Qty: 10 ML | Refills: 1 | Status: SHIPPED | OUTPATIENT
Start: 2025-08-07 | End: 2025-08-07 | Stop reason: WASHOUT

## 2025-08-07 RX ORDER — GABAPENTIN 300 MG/1
300 CAPSULE ORAL NIGHTLY
Qty: 100 CAPSULE | Refills: 2 | Status: CANCELLED | OUTPATIENT
Start: 2025-08-07

## 2025-08-07 RX ORDER — TIRZEPATIDE 12.5 MG/.5ML
12.5 INJECTION, SOLUTION SUBCUTANEOUS WEEKLY
Qty: 2 ML | Refills: 2 | Status: SHIPPED | OUTPATIENT
Start: 2025-08-07

## 2025-08-07 ASSESSMENT — ACTIVITIES OF DAILY LIVING (ADL)
DRESSING: INDEPENDENT
GROCERY_SHOPPING: INDEPENDENT
TAKING_MEDICATION: INDEPENDENT
MANAGING_FINANCES: INDEPENDENT
BATHING: INDEPENDENT
DOING_HOUSEWORK: INDEPENDENT

## 2025-08-07 ASSESSMENT — ENCOUNTER SYMPTOMS
LOSS OF SENSATION IN FEET: 0
OCCASIONAL FEELINGS OF UNSTEADINESS: 1
DEPRESSION: 0

## 2025-08-07 NOTE — TELEPHONE ENCOUNTER
Message from pharmacy that they received an escript today for Insulin Lispro vials with qty 1 vial.    Patient usually gets the Humalog Kwik pens.  You can call in with a new order for the pens or escribe an order to [facility]

## 2025-08-07 NOTE — ASSESSMENT & PLAN NOTE
Orders:    tirzepatide (Mounjaro) 12.5 mg/0.5 mL pen injector; Inject 12.5 mg under the skin 1 (one) time per week.    insulin glargine (Toujeo Max U-300 SoloStar) 300 unit/mL (3 mL) pen; Inject 30 Units under the skin once daily in the morning.    insulin lispro 100 unit/mL injection; Inject 20 Units under the skin 3 times daily (morning, midday, late afternoon). Take as directed per insulin instructions.  May take a maximum 32 units per meal    Basic Metabolic Panel; Future    Hemoglobin A1C; Future    Lipid Panel; Future

## 2025-08-07 NOTE — PROGRESS NOTES
Subjective   Reason for Visit: Yg Vergara is an 75 y.o. male here for a Medicare Wellness visit.     Past Medical, Surgical, and Family History reviewed and updated in chart.    Reviewed all medications by prescribing practitioner or clinical pharmacist (such as prescriptions, OTCs, herbal therapies and supplements) and documented in the medical record.    HPI  Labs reviewed today LDL is 60 total cholesterol 116 has an elevated albumin creatinine ratio of 234.  Glucose 125 creatinine 1.26 with a normal EGFR 61 hemoglobin A1c 7.1 with a PSA of 1.56.  Blood sugar log sheet reviewed.      Hypertension not controlled will increase lisinopril to 40 mg daily and continue hydrochlorothiazide 12.5 at its normal dose.    CKD3B has improved off of Lasix.      Bph with 5 times nocturia patient is taking his Flomax in the morning we will increase the dose to 0.8 and have him take at bedtime.     DM II   On Mounjaro.  Increase Toujeo down to 30 units since he has been having low blood sugars at night.  His current dosing of 20 units over 1 5010 units under 150 for his mealtime insulin.        Neruopathy both feet   Has seen podiatrist  toenails every 3 months   Getting cataracts removed left eye was done 3 weeks ago [facility]   Takes gabapentin prn      Edema  Stable     Patient has living will healthcare power of  and is a DO NOT RESUSCITATE.     AAA 2019 had an ultrasound of the abdominal aorta that showed mild dilatation at 3.2 cm   Patient Care Team:  Kirk Downing MD as PCP - General (Family Medicine)  Kirk Downing MD as PCP - [employer] PCP     Review of Systems    Objective   Vitals:  BP (!) 150/98   Pulse 104   Wt 117 kg (257 lb)   SpO2 95%   BMI 40.86 kg/m²       Physical Exam  Constitutional:       Appearance: Normal appearance.   HENT:      Head: Normocephalic and atraumatic.     Eyes:      Conjunctiva/sclera: Conjunctivae normal.      Pupils: Pupils are equal, round, and  reactive to light.       Cardiovascular:      Rate and Rhythm: Normal rate and regular rhythm.      Heart sounds: Normal heart sounds.   Pulmonary:      Effort: Pulmonary effort is normal.      Breath sounds: Normal breath sounds.   Lymphadenopathy:      Cervical: No cervical adenopathy.     Skin:     Coloration: Skin is not jaundiced.     Neurological:      General: No focal deficit present.      Mental Status: He is alert and oriented to person, place, and time.     Psychiatric:         Mood and Affect: Mood normal.         Behavior: Behavior normal.         Thought Content: Thought content normal.         Judgment: Judgment normal.         Assessment & Plan  Localized edema         Diabetic polyneuropathy associated with type 2 diabetes mellitus         Type 2 diabetes mellitus with hyperglycemia, with long-term current use of insulin (Multi)    Orders:    tirzepatide (Mounjaro) 12.5 mg/0.5 mL pen injector; Inject 12.5 mg under the skin 1 (one) time per week.    insulin glargine (Toujeo Max U-300 SoloStar) 300 unit/mL (3 mL) pen; Inject 30 Units under the skin once daily in the morning.    insulin lispro 100 unit/mL injection; Inject 20 Units under the skin 3 times daily (morning, midday, late afternoon). Take as directed per insulin instructions.  May take a maximum 32 units per meal    Basic Metabolic Panel; Future    Hemoglobin A1C; Future    Lipid Panel; Future    Routine general medical examination at health care facility    Orders:    1 Year Follow Up In Primary Care - Wellness Exam; Future    Benign prostatic hyperplasia with nocturia    Orders:    tamsulosin (Flomax) 0.4 mg 24 hr capsule; Take 2 capsules (0.8 mg) by mouth once daily at bedtime.    Primary hypertension    Orders:    lisinopril 40 mg tablet; Take 1 tablet (40 mg) by mouth once daily.    hydroCHLOROthiazide (Microzide) 12.5 mg tablet; Take 1 tablet (12.5 mg) by mouth once daily.    Obesity, morbid (Multi)         Stage 3b chronic kidney  disease (Multi)

## 2026-02-09 ENCOUNTER — APPOINTMENT (OUTPATIENT)
Dept: PRIMARY CARE | Facility: CLINIC | Age: 76
End: 2026-02-09
Payer: MEDICARE